# Patient Record
Sex: FEMALE | Race: WHITE | NOT HISPANIC OR LATINO | Employment: UNEMPLOYED | URBAN - METROPOLITAN AREA
[De-identification: names, ages, dates, MRNs, and addresses within clinical notes are randomized per-mention and may not be internally consistent; named-entity substitution may affect disease eponyms.]

---

## 2022-10-24 ENCOUNTER — OFFICE VISIT (OUTPATIENT)
Dept: MEDICAL GROUP | Facility: MEDICAL CENTER | Age: 57
End: 2022-10-24
Payer: COMMERCIAL

## 2022-10-24 ENCOUNTER — TELEPHONE (OUTPATIENT)
Dept: SCHEDULING | Facility: IMAGING CENTER | Age: 57
End: 2022-10-24

## 2022-10-24 VITALS
SYSTOLIC BLOOD PRESSURE: 128 MMHG | HEART RATE: 73 BPM | TEMPERATURE: 98.1 F | OXYGEN SATURATION: 99 % | WEIGHT: 169.75 LBS | HEIGHT: 64 IN | RESPIRATION RATE: 14 BRPM | BODY MASS INDEX: 28.98 KG/M2 | DIASTOLIC BLOOD PRESSURE: 84 MMHG

## 2022-10-24 DIAGNOSIS — M65.331 TRIGGER MIDDLE FINGER OF RIGHT HAND: ICD-10-CM

## 2022-10-24 DIAGNOSIS — Z00.00 ENCOUNTER FOR MEDICAL EXAMINATION TO ESTABLISH CARE: ICD-10-CM

## 2022-10-24 DIAGNOSIS — Z12.31 ENCOUNTER FOR SCREENING MAMMOGRAM FOR MALIGNANT NEOPLASM OF BREAST: ICD-10-CM

## 2022-10-24 DIAGNOSIS — Z82.49 FAMILY HISTORY OF EARLY CAD: ICD-10-CM

## 2022-10-24 PROCEDURE — 99204 OFFICE O/P NEW MOD 45 MIN: CPT | Performed by: STUDENT IN AN ORGANIZED HEALTH CARE EDUCATION/TRAINING PROGRAM

## 2022-10-24 ASSESSMENT — PATIENT HEALTH QUESTIONNAIRE - PHQ9: CLINICAL INTERPRETATION OF PHQ2 SCORE: 0

## 2022-10-24 NOTE — PROGRESS NOTES
"Subjective:     CC:  Diagnoses of Encounter for screening mammogram for malignant neoplasm of breast, BMI 29.0-29.9,adult, Family history of early CAD, and Trigger middle finger of right hand were pertinent to this visit.    HISTORY OF THE PRESENT ILLNESS: Patient is a 56 y.o. female. This pleasant patient is here today to establish care and discuss the following    Problem   Bmi 29.0-29.9,Adult   Family History of Early Cad    Both parents had heart attacks, 140 someone in their 50s.     Trigger Middle Finger of Right Hand    Previously saw musculoskeletal doctor and had cortisone injections.  She feels like her trigger finger is getting worse.  Sometimes she cannot hold a pen due to the stiffness.  This is on her right side so it does make everyday tasks difficult.         Health Maintenance: Completed    ROS:   ROS      Objective:     Exam: /84 (BP Location: Left arm, Patient Position: Sitting, BP Cuff Size: Adult)   Pulse 73   Temp 36.7 °C (98.1 °F) (Temporal)   Resp 14   Ht 1.615 m (5' 3.58\")   Wt 77 kg (169 lb 12.1 oz)   SpO2 99%  Body mass index is 29.52 kg/m².    Physical Exam  Vitals reviewed.   Constitutional:       General: She is not in acute distress.     Appearance: She is not toxic-appearing.   HENT:      Head: Normocephalic and atraumatic.      Right Ear: External ear normal.      Left Ear: External ear normal.      Nose: Nose normal. No congestion.      Mouth/Throat:      Mouth: Mucous membranes are moist.      Pharynx: Oropharynx is clear. No oropharyngeal exudate.   Eyes:      General:         Right eye: No discharge.         Left eye: No discharge.      Extraocular Movements: Extraocular movements intact.      Conjunctiva/sclera: Conjunctivae normal.   Cardiovascular:      Rate and Rhythm: Normal rate and regular rhythm.      Pulses: Normal pulses.      Heart sounds: Normal heart sounds. No murmur heard.  Pulmonary:      Effort: Pulmonary effort is normal. No respiratory distress.     "  Breath sounds: Normal breath sounds.   Abdominal:      General: Abdomen is flat. Bowel sounds are normal. There is no distension.      Palpations: Abdomen is soft.      Tenderness: There is no abdominal tenderness.   Musculoskeletal:         General: Normal range of motion.   Skin:     General: Skin is warm and dry.      Capillary Refill: Capillary refill takes less than 2 seconds.   Neurological:      Mental Status: She is alert.   Psychiatric:         Mood and Affect: Mood normal.         Behavior: Behavior normal.         Thought Content: Thought content normal.         Judgment: Judgment normal.         Assessment & Plan:   56 y.o. female with the following -    1. Encounter for medical examination to establish care  Medications, history, problem list and allergies reviewed.  Previous records requested from last doctor.    2. Encounter for screening mammogram for malignant neoplasm of breast  - MA-SCREENING MAMMO BILAT W/TOMOSYNTHESIS W/CAD; Future    3. BMI 29.0-29.9,adult  Screening as below  - HEMOGLOBIN A1C; Future  - Lipid Profile; Future    4. Family history of early CAD  Lipid screening due to premature cardiovascular disease in first-degree relatives  - Lipid Profile; Future    5. Trigger middle finger of right hand  Patient would like to proceed with possible joint injections, referral to orthopedics placed  - Referral to Orthopedics    Return in about 2 weeks (around 11/7/2022) for labs.    Please note that this dictation was created using voice recognition software. I have made every reasonable attempt to correct obvious errors, but I expect that there are errors of grammar and possibly content that I did not discover before finalizing the note.

## 2022-10-24 NOTE — LETTER
[facility]  Martine Campos M.D.  [address]  [place] [address]  Fax: [phone]   Authorization for Release/Disclosure of   Protected Health Information   Name: ANGELA WORTHINGTON : 1965 SSN: [id number]   Address: [address] [place] [id number] Phone:    [phone] (home)    I authorize the entity listed below to release/disclose the PHI below to:   [facility]/Martine Campos M.D. and Martine Campos M.D.   Provider or Entity Name:  Dr. Hamman [facility], [facility] Phone:      Fax:     Reason for request: continuity of care   Information to be released:    [  ] LAST COLONOSCOPY,  including any PATH REPORT and follow-up  [  ] LAST FIT/COLOGUARD RESULT [  ] LAST DEXA  [  ] LAST MAMMOGRAM  [  ] LAST PAP  [  ] LAST LABS [  ] RETINA EXAM REPORT  [  ] IMMUNIZATION RECORDS  [ XX ] Release all info      [  ] Check here and initial the line next to each item to release ALL health information INCLUDING  _____ Care and treatment for drug and / or alcohol abuse  _____ HIV testing, infection status, or AIDS  _____ Genetic Testing    DATES OF SERVICE OR TIME PERIOD TO BE DISCLOSED: _____________  I understand and acknowledge that:  * This Authorization may be revoked at any time by you in writing, except if your health information has already been used or disclosed.  * Your health information that will be used or disclosed as a result of you signing this authorization could be re-disclosed by the recipient. If this occurs, your re-disclosed health information may no longer be protected by State or Federal laws.  * You may refuse to sign this Authorization. Your refusal will not affect your ability to obtain treatment.  * This Authorization becomes effective upon signing and will  on (date) __________.      If no date is indicated, this Authorization will  one (1) year from the signature date.    Name: Angela  Darrell    Signature:   Date:     10/24/2022       PLEASE FAX REQUESTED RECORDS BACK TO: [phone]

## 2022-10-25 ENCOUNTER — HOSPITAL ENCOUNTER (OUTPATIENT)
Dept: LAB | Facility: MEDICAL CENTER | Age: 57
End: 2022-10-25
Attending: STUDENT IN AN ORGANIZED HEALTH CARE EDUCATION/TRAINING PROGRAM
Payer: COMMERCIAL

## 2022-10-25 DIAGNOSIS — Z82.49 FAMILY HISTORY OF EARLY CAD: ICD-10-CM

## 2022-10-25 LAB
CHOLEST SERPL-MCNC: 212 MG/DL (ref 100–199)
EST. AVERAGE GLUCOSE BLD GHB EST-MCNC: 114 MG/DL
FASTING STATUS PATIENT QL REPORTED: NORMAL
HBA1C MFR BLD: 5.6 % (ref 4–5.6)
HDLC SERPL-MCNC: 74 MG/DL
LDLC SERPL CALC-MCNC: 115 MG/DL
TRIGL SERPL-MCNC: 113 MG/DL (ref 0–149)

## 2022-10-25 PROCEDURE — 80061 LIPID PANEL: CPT

## 2022-10-25 PROCEDURE — 83036 HEMOGLOBIN GLYCOSYLATED A1C: CPT

## 2022-10-25 PROCEDURE — 36415 COLL VENOUS BLD VENIPUNCTURE: CPT

## 2022-10-27 ENCOUNTER — TELEPHONE (OUTPATIENT)
Dept: MEDICAL GROUP | Facility: MEDICAL CENTER | Age: 57
End: 2022-10-27
Payer: COMMERCIAL

## 2022-10-27 ENCOUNTER — HOSPITAL ENCOUNTER (OUTPATIENT)
Dept: RADIOLOGY | Facility: MEDICAL CENTER | Age: 57
End: 2022-10-27
Attending: STUDENT IN AN ORGANIZED HEALTH CARE EDUCATION/TRAINING PROGRAM
Payer: COMMERCIAL

## 2022-10-27 DIAGNOSIS — Z12.31 ENCOUNTER FOR SCREENING MAMMOGRAM FOR MALIGNANT NEOPLASM OF BREAST: ICD-10-CM

## 2022-10-27 PROCEDURE — 77063 BREAST TOMOSYNTHESIS BI: CPT

## 2022-10-27 NOTE — TELEPHONE ENCOUNTER
Phone Number Called: [phone]    Call outcome: Spoke to patient regarding message below.    Message: Pt was contacted in regards of lab results. Pt will follow up with her pcp.

## 2022-10-27 NOTE — TELEPHONE ENCOUNTER
----- Message from Martine Campos M.D. sent at 10/25/2022 11:39 AM PDT -----  Please let patient know that blood sugars are normal.  Cholesterol is slightly elevated.  We will talk about this more at her next visit.    Thank You,  Dr. Campos

## 2022-11-10 ENCOUNTER — APPOINTMENT (OUTPATIENT)
Dept: MEDICAL GROUP | Facility: MEDICAL CENTER | Age: 57
End: 2022-11-10
Payer: COMMERCIAL

## 2022-11-11 ENCOUNTER — OFFICE VISIT (OUTPATIENT)
Dept: MEDICAL GROUP | Facility: MEDICAL CENTER | Age: 57
End: 2022-11-11
Payer: COMMERCIAL

## 2022-11-11 VITALS
WEIGHT: 169 LBS | TEMPERATURE: 97.5 F | HEIGHT: 63 IN | SYSTOLIC BLOOD PRESSURE: 110 MMHG | DIASTOLIC BLOOD PRESSURE: 78 MMHG | RESPIRATION RATE: 14 BRPM | OXYGEN SATURATION: 99 % | BODY MASS INDEX: 29.95 KG/M2 | HEART RATE: 84 BPM

## 2022-11-11 DIAGNOSIS — R13.19 ESOPHAGEAL DYSPHAGIA: ICD-10-CM

## 2022-11-11 DIAGNOSIS — E78.49 OTHER HYPERLIPIDEMIA: ICD-10-CM

## 2022-11-11 PROCEDURE — 99214 OFFICE O/P EST MOD 30 MIN: CPT | Performed by: STUDENT IN AN ORGANIZED HEALTH CARE EDUCATION/TRAINING PROGRAM

## 2022-11-11 NOTE — PROGRESS NOTES
"Subjective:     CC: Swallowing issue, labs    HPI:   Jaymie presents today with    Problem   Other Hyperlipidemia    The 10-year ASCVD risk score (Dakotah ZABALA, et al., 2019) is: 1.5%     Lab Results   Component Value Date/Time    CHOLSTRLTOT 212 (H) 10/25/2022 09:30 AM     (H) 10/25/2022 09:30 AM    HDL 74 10/25/2022 09:30 AM    TRIGLYCERIDE 113 10/25/2022 09:30 AM              Esophageal Dysphagia    Sensation of food getting stuck in her chest when she swallows sometimes.  Notices it only with solid foods and not with liquids.           Health Maintenance: Completed    ROS:  ROS    Objective:     Exam:  /78 (BP Location: Left arm, Patient Position: Sitting, BP Cuff Size: Adult)   Pulse 84   Temp 36.4 °C (97.5 °F) (Temporal)   Resp 14   Ht 1.6 m (5' 3\")   Wt 76.7 kg (169 lb)   SpO2 99%   BMI 29.94 kg/m²  Body mass index is 29.94 kg/m².    Physical Exam  Vitals reviewed.   Constitutional:       General: She is not in acute distress.     Appearance: She is not toxic-appearing.   HENT:      Head: Normocephalic and atraumatic.      Right Ear: External ear normal.      Left Ear: External ear normal.   Eyes:      General:         Right eye: No discharge.         Left eye: No discharge.      Extraocular Movements: Extraocular movements intact.      Conjunctiva/sclera: Conjunctivae normal.   Pulmonary:      Effort: Pulmonary effort is normal. No respiratory distress.   Skin:     General: Skin is warm and dry.   Neurological:      Mental Status: She is alert.   Psychiatric:         Mood and Affect: Mood normal.         Behavior: Behavior normal.         Thought Content: Thought content normal.         Judgment: Judgment normal.       Assessment & Plan:     57 y.o. female with the following -     1. Other hyperlipidemia  This is a new problem, ASCVD risk very low.  Discussed her slight increase in risk due to her family history of early MI.  Follow-up in 1 year.    2. Esophageal dysphagia  Concern for " esophageal dysmotility.  Barium swallow ordered.  - DX-ESOPHAGUS BARIUM SWALLOW SINGLE CONTRAST; Future    Return in about 1 year (around 11/11/2023).    Please note that this dictation was created using voice recognition software. I have made every reasonable attempt to correct obvious errors, but I expect that there are errors of grammar and possibly content that I did not discover before finalizing the note.

## 2022-12-01 ENCOUNTER — TELEPHONE (OUTPATIENT)
Dept: HEALTH INFORMATION MANAGEMENT | Facility: OTHER | Age: 57
End: 2022-12-01

## 2022-12-01 ENCOUNTER — HOSPITAL ENCOUNTER (OUTPATIENT)
Dept: RADIOLOGY | Facility: MEDICAL CENTER | Age: 57
End: 2022-12-01
Attending: STUDENT IN AN ORGANIZED HEALTH CARE EDUCATION/TRAINING PROGRAM
Payer: COMMERCIAL

## 2022-12-01 DIAGNOSIS — R13.19 ESOPHAGEAL DYSPHAGIA: ICD-10-CM

## 2022-12-01 PROCEDURE — 74220 X-RAY XM ESOPHAGUS 1CNTRST: CPT

## 2022-12-01 NOTE — TELEPHONE ENCOUNTER
I called and spoke to the patient and delivered Martine's message:  that her barium swallow showed severe acid reflux and a moderate hiatal hernia which means a portion of the stomach is entering into the space where the esophagus is.  If it is bothering her or she is having reflux symptoms we can start a medication for heartburn.  There are also surgical options if she would like to fix the hiatal hernia, I would like her to schedule an appointment if she wants to talk about this further.      The patient denied having reflux.

## 2022-12-12 ENCOUNTER — TELEPHONE (OUTPATIENT)
Dept: HEALTH INFORMATION MANAGEMENT | Facility: OTHER | Age: 57
End: 2022-12-12
Payer: COMMERCIAL

## 2022-12-12 DIAGNOSIS — R13.19 ESOPHAGEAL DYSPHAGIA: ICD-10-CM

## 2022-12-13 NOTE — TELEPHONE ENCOUNTER
I spoke to the patient regarding the results of the barium swallow.  She reported having been told  about the aid reflux, however she had not been informed of the hiatal hernia.    The patient continues with significant swallowing problems and asks what is the next thing to figure out the swallowing issue.                                                                                                                                                                                               with significant swallowing probllems and asks

## 2022-12-13 NOTE — TELEPHONE ENCOUNTER
Please let patient know that I would suggest that the next step would be a referral to GI for a possible endoscopy. If she would like me to put in this referral I am happy to.    Thank You,  Dr. Campos

## 2023-06-13 ENCOUNTER — OFFICE VISIT (OUTPATIENT)
Dept: MEDICAL GROUP | Facility: MEDICAL CENTER | Age: 58
End: 2023-06-13
Payer: COMMERCIAL

## 2023-06-13 VITALS
OXYGEN SATURATION: 99 % | DIASTOLIC BLOOD PRESSURE: 72 MMHG | HEIGHT: 63 IN | HEART RATE: 77 BPM | BODY MASS INDEX: 30.47 KG/M2 | TEMPERATURE: 97.3 F | SYSTOLIC BLOOD PRESSURE: 114 MMHG | WEIGHT: 171.96 LBS | RESPIRATION RATE: 16 BRPM

## 2023-06-13 DIAGNOSIS — Z12.11 COLON CANCER SCREENING: ICD-10-CM

## 2023-06-13 DIAGNOSIS — L98.9 SKIN LESION OF BACK: ICD-10-CM

## 2023-06-13 DIAGNOSIS — Z23 IMMUNIZATION DUE: ICD-10-CM

## 2023-06-13 DIAGNOSIS — K40.90 NON-RECURRENT UNILATERAL INGUINAL HERNIA WITHOUT OBSTRUCTION OR GANGRENE: ICD-10-CM

## 2023-06-13 DIAGNOSIS — L98.9 SKIN LESION OF FOOT: ICD-10-CM

## 2023-06-13 PROCEDURE — 3074F SYST BP LT 130 MM HG: CPT | Performed by: STUDENT IN AN ORGANIZED HEALTH CARE EDUCATION/TRAINING PROGRAM

## 2023-06-13 PROCEDURE — 90715 TDAP VACCINE 7 YRS/> IM: CPT | Performed by: STUDENT IN AN ORGANIZED HEALTH CARE EDUCATION/TRAINING PROGRAM

## 2023-06-13 PROCEDURE — 99214 OFFICE O/P EST MOD 30 MIN: CPT | Mod: 25 | Performed by: STUDENT IN AN ORGANIZED HEALTH CARE EDUCATION/TRAINING PROGRAM

## 2023-06-13 PROCEDURE — 3078F DIAST BP <80 MM HG: CPT | Performed by: STUDENT IN AN ORGANIZED HEALTH CARE EDUCATION/TRAINING PROGRAM

## 2023-06-13 PROCEDURE — 90471 IMMUNIZATION ADMIN: CPT | Performed by: STUDENT IN AN ORGANIZED HEALTH CARE EDUCATION/TRAINING PROGRAM

## 2023-06-13 ASSESSMENT — PATIENT HEALTH QUESTIONNAIRE - PHQ9: CLINICAL INTERPRETATION OF PHQ2 SCORE: 0

## 2023-06-13 NOTE — PROGRESS NOTES
"Subjective:     CC: Skin lesions, inguinal hernia    HPI:   Jaymie presents today with    Problem   Skin Lesion of Back    This is a chronic condition.  She has 2 moles that have appeared under the waistband on her lower back on the left side.  These are not in sun exposed areas.     Skin Lesion of Foot    This is a chronic condition.  She has a dark brown lesion on the bottom of her left foot.  She states it has been there for couple years but is growing.  This is not the sun exposed area.     Non-Recurrent Unilateral Inguinal Hernia Without Obstruction Or Gangrene    This is a new condition.  She has a new bulge in the left groin.  She states that sometimes it causes discomfort.  There is definitely pain when she is exerting herself.  At first she thought it was related to her hip pain, but it appears softer and in a different location.       ROS:  ROS    Objective:     Exam:  /72   Pulse 77   Temp 36.3 °C (97.3 °F) (Temporal)   Resp 16   Ht 1.6 m (5' 3\")   Wt 78 kg (171 lb 15.3 oz)   SpO2 99%   BMI 30.46 kg/m²  Body mass index is 30.46 kg/m².    Physical Exam  Vitals reviewed.   Constitutional:       General: She is not in acute distress.     Appearance: She is not toxic-appearing.   HENT:      Head: Normocephalic and atraumatic.      Right Ear: External ear normal.      Left Ear: External ear normal.   Eyes:      General:         Right eye: No discharge.         Left eye: No discharge.      Extraocular Movements: Extraocular movements intact.      Conjunctiva/sclera: Conjunctivae normal.   Pulmonary:      Effort: Pulmonary effort is normal. No respiratory distress.   Abdominal:      General: Abdomen is flat.      Palpations: Abdomen is soft.      Hernia: A hernia (3-4 cm bulge in the right groin, reducible, soft) is present.   Skin:     General: Skin is warm and dry.      Comments: Dark brown lesion on the bottom of the left foot.  Smudged borders, not raised.  Two light brown moles on the left " buttock/lower back.   Neurological:      Mental Status: She is alert.   Psychiatric:         Mood and Affect: Mood normal.         Behavior: Behavior normal.         Thought Content: Thought content normal.         Judgment: Judgment normal.           Assessment & Plan:     57 y.o. female with the following -     1. Non-recurrent unilateral inguinal hernia without obstruction or gangrene  Exam is very concerning for hernia.  Ultrasound ordered.  Based on results I will send her to general surgery  - US-INGUINAL HERNIA; Future    2. Skin lesion of foot  This is a chronic condition, given the location and onset exposed area and the growth of the lesion I am concerned for possible melanoma, referral to dermatology sent as urgent  - Referral to Dermatology    3. Skin lesion of back  This is a chronic condition, 2 moles on the buttocks, these are not in sun exposed areas, the appearance is not concerning, referral to dermatology sent  - Referral to Dermatology    4. Immunization due  - Tdap =>8yo IM    5. Colon cancer screening  - COLOGUARD (FIT DNA)        No follow-ups on file.    Please note that this dictation was created using voice recognition software. I have made every reasonable attempt to correct obvious errors, but I expect that there are errors of grammar and possibly content that I did not discover before finalizing the note.

## 2023-07-03 ENCOUNTER — TELEPHONE (OUTPATIENT)
Dept: MEDICAL GROUP | Facility: MEDICAL CENTER | Age: 58
End: 2023-07-03
Payer: COMMERCIAL

## 2023-07-03 NOTE — TELEPHONE ENCOUNTER
Phone Number Called: [phone]    Call outcome: Spoke to patient regarding message below.    Message: Spoke to patient regarding message below. No additional questions.

## 2023-07-03 NOTE — TELEPHONE ENCOUNTER
----- Message from Martine Campos M.D. sent at 7/3/2023  3:04 PM PDT -----  Please let patient know that her Cologuard testing was normal    Thank You,  Dr. Campos

## 2023-07-06 ENCOUNTER — APPOINTMENT (RX ONLY)
Dept: URBAN - METROPOLITAN AREA CLINIC 4 | Facility: CLINIC | Age: 58
Setting detail: DERMATOLOGY
End: 2023-07-06

## 2023-07-06 DIAGNOSIS — Z71.89 OTHER SPECIFIED COUNSELING: ICD-10-CM

## 2023-07-06 DIAGNOSIS — D22 MELANOCYTIC NEVI: ICD-10-CM

## 2023-07-06 DIAGNOSIS — L81.4 OTHER MELANIN HYPERPIGMENTATION: ICD-10-CM

## 2023-07-06 DIAGNOSIS — L82.1 OTHER SEBORRHEIC KERATOSIS: ICD-10-CM

## 2023-07-06 PROBLEM — D22.5 MELANOCYTIC NEVI OF TRUNK: Status: ACTIVE | Noted: 2023-07-06

## 2023-07-06 PROBLEM — D22.9 MELANOCYTIC NEVI, UNSPECIFIED: Status: ACTIVE | Noted: 2023-07-06

## 2023-07-06 PROBLEM — D22.62 MELANOCYTIC NEVI OF LEFT UPPER LIMB, INCLUDING SHOULDER: Status: ACTIVE | Noted: 2023-07-06

## 2023-07-06 PROBLEM — D22.71 MELANOCYTIC NEVI OF RIGHT LOWER LIMB, INCLUDING HIP: Status: ACTIVE | Noted: 2023-07-06

## 2023-07-06 PROCEDURE — ? COUNSELING

## 2023-07-06 PROCEDURE — 99203 OFFICE O/P NEW LOW 30 MIN: CPT

## 2023-07-06 PROCEDURE — ? ADDITIONAL NOTES

## 2023-07-06 ASSESSMENT — LOCATION ZONE DERM
LOCATION ZONE: FEET
LOCATION ZONE: TRUNK
LOCATION ZONE: HAND

## 2023-07-06 ASSESSMENT — LOCATION SIMPLE DESCRIPTION DERM
LOCATION SIMPLE: LEFT HAND
LOCATION SIMPLE: RIGHT UPPER BACK
LOCATION SIMPLE: RIGHT PLANTAR SURFACE
LOCATION SIMPLE: ABDOMEN
LOCATION SIMPLE: LEFT UPPER BACK
LOCATION SIMPLE: CHEST

## 2023-07-06 ASSESSMENT — LOCATION DETAILED DESCRIPTION DERM
LOCATION DETAILED: RIGHT PLANTAR FOREFOOT OVERLYING 1ST METATARSAL
LOCATION DETAILED: PERIUMBILICAL SKIN
LOCATION DETAILED: MIDDLE STERNUM
LOCATION DETAILED: RIGHT MEDIAL UPPER BACK
LOCATION DETAILED: LEFT THENAR EMINENCE
LOCATION DETAILED: LEFT SUPERIOR UPPER BACK
LOCATION DETAILED: RIGHT SUPERIOR UPPER BACK

## 2023-07-06 NOTE — PROCEDURE: ADDITIONAL NOTES
Additional Notes: Will re-check the nevus of the back at f/u. Self skin monitoring with reporting. Photos taken.
Detail Level: Simple
Render Risk Assessment In Note?: no

## 2023-07-06 NOTE — PROCEDURE: MIPS QUALITY
Patent
Detail Level: Detailed
Quality 226: Preventive Care And Screening: Tobacco Use: Screening And Cessation Intervention: Patient screened for tobacco use and is an ex/non-smoker
Quality 130: Documentation Of Current Medications In The Medical Record: Current Medications Documented

## 2023-07-19 ENCOUNTER — HOSPITAL ENCOUNTER (OUTPATIENT)
Dept: RADIOLOGY | Facility: MEDICAL CENTER | Age: 58
End: 2023-07-19
Attending: STUDENT IN AN ORGANIZED HEALTH CARE EDUCATION/TRAINING PROGRAM
Payer: COMMERCIAL

## 2023-07-19 DIAGNOSIS — K40.90 NON-RECURRENT UNILATERAL INGUINAL HERNIA WITHOUT OBSTRUCTION OR GANGRENE: ICD-10-CM

## 2023-07-19 PROCEDURE — 76857 US EXAM PELVIC LIMITED: CPT

## 2023-07-20 NOTE — RESULT ENCOUNTER NOTE
Please let patient know that she does have a hernia.  The treatment for this would be to see a general surgeon and get a hernia repair.  Please let me know if she is interested and I will put in the referral.    Thank You,  Dr. Campos

## 2023-07-21 ENCOUNTER — TELEPHONE (OUTPATIENT)
Dept: MEDICAL GROUP | Facility: MEDICAL CENTER | Age: 58
End: 2023-07-21
Payer: COMMERCIAL

## 2023-07-21 DIAGNOSIS — K40.90 NON-RECURRENT UNILATERAL INGUINAL HERNIA WITHOUT OBSTRUCTION OR GANGRENE: ICD-10-CM

## 2023-07-21 NOTE — TELEPHONE ENCOUNTER
----- Message from Karen Macdonald R.N. sent at 7/21/2023 12:42 PM PDT -----  Discussed results with patient she would like a referral to General surgery

## 2023-07-21 NOTE — TELEPHONE ENCOUNTER
Please let patient know that I have put in that referral.  It can take about a week for it to be completed    Thank You,  Dr. Campos

## 2023-08-30 ENCOUNTER — APPOINTMENT (OUTPATIENT)
Dept: ADMISSIONS | Facility: MEDICAL CENTER | Age: 58
End: 2023-08-30
Attending: SURGERY
Payer: COMMERCIAL

## 2023-09-18 ENCOUNTER — PRE-ADMISSION TESTING (OUTPATIENT)
Dept: ADMISSIONS | Facility: MEDICAL CENTER | Age: 58
End: 2023-09-18
Attending: SURGERY
Payer: COMMERCIAL

## 2023-09-18 VITALS — HEIGHT: 64 IN | BODY MASS INDEX: 29.52 KG/M2

## 2023-09-18 NOTE — PREPROCEDURE INSTRUCTIONS
"Pre-admit appointment completed.  \"Preparing for your procedure\" reviewed with pt along with verbal and written instructions.  Patient instructed per pharmacy guidelines regarding taking or holding regularly prescribed medications before surgery.  Instructed to take the following medications the day of surgery with a sip of water per pharmacy medication guidelines:   none  "

## 2023-10-11 ENCOUNTER — ANESTHESIA (OUTPATIENT)
Dept: SURGERY | Facility: MEDICAL CENTER | Age: 58
End: 2023-10-11
Payer: COMMERCIAL

## 2023-10-11 ENCOUNTER — HOSPITAL ENCOUNTER (OUTPATIENT)
Facility: MEDICAL CENTER | Age: 58
End: 2023-10-11
Attending: SURGERY | Admitting: SURGERY
Payer: COMMERCIAL

## 2023-10-11 ENCOUNTER — ANESTHESIA EVENT (OUTPATIENT)
Dept: SURGERY | Facility: MEDICAL CENTER | Age: 58
End: 2023-10-11
Payer: COMMERCIAL

## 2023-10-11 VITALS
DIASTOLIC BLOOD PRESSURE: 74 MMHG | TEMPERATURE: 97.3 F | SYSTOLIC BLOOD PRESSURE: 132 MMHG | OXYGEN SATURATION: 96 % | BODY MASS INDEX: 30.47 KG/M2 | HEIGHT: 63 IN | WEIGHT: 171.96 LBS | RESPIRATION RATE: 18 BRPM | HEART RATE: 89 BPM

## 2023-10-11 DIAGNOSIS — K40.90 NON-RECURRENT UNILATERAL INGUINAL HERNIA WITHOUT OBSTRUCTION OR GANGRENE: ICD-10-CM

## 2023-10-11 LAB — PATHOLOGY CONSULT NOTE: NORMAL

## 2023-10-11 PROCEDURE — 160048 HCHG OR STATISTICAL LEVEL 1-5: Performed by: SURGERY

## 2023-10-11 PROCEDURE — 160002 HCHG RECOVERY MINUTES (STAT): Performed by: SURGERY

## 2023-10-11 PROCEDURE — 160046 HCHG PACU - 1ST 60 MINS PHASE II: Performed by: SURGERY

## 2023-10-11 PROCEDURE — 502000 HCHG MISC OR IMPLANTS RC 0278: Performed by: SURGERY

## 2023-10-11 PROCEDURE — 160009 HCHG ANES TIME/MIN: Performed by: SURGERY

## 2023-10-11 PROCEDURE — 700105 HCHG RX REV CODE 258: Performed by: SURGERY

## 2023-10-11 PROCEDURE — 700101 HCHG RX REV CODE 250: Performed by: ANESTHESIOLOGY

## 2023-10-11 PROCEDURE — 160029 HCHG SURGERY MINUTES - 1ST 30 MINS LEVEL 4: Performed by: SURGERY

## 2023-10-11 PROCEDURE — 88305 TISSUE EXAM BY PATHOLOGIST: CPT

## 2023-10-11 PROCEDURE — A9270 NON-COVERED ITEM OR SERVICE: HCPCS | Performed by: ANESTHESIOLOGY

## 2023-10-11 PROCEDURE — 700111 HCHG RX REV CODE 636 W/ 250 OVERRIDE (IP): Mod: JZ | Performed by: ANESTHESIOLOGY

## 2023-10-11 PROCEDURE — 502714 HCHG ROBOTIC SURGERY SERVICES: Performed by: SURGERY

## 2023-10-11 PROCEDURE — C1781 MESH (IMPLANTABLE): HCPCS | Performed by: SURGERY

## 2023-10-11 PROCEDURE — 160041 HCHG SURGERY MINUTES - EA ADDL 1 MIN LEVEL 4: Performed by: SURGERY

## 2023-10-11 PROCEDURE — 160025 RECOVERY II MINUTES (STATS): Performed by: SURGERY

## 2023-10-11 PROCEDURE — 700101 HCHG RX REV CODE 250: Performed by: SURGERY

## 2023-10-11 PROCEDURE — 160035 HCHG PACU - 1ST 60 MINS PHASE I: Performed by: SURGERY

## 2023-10-11 PROCEDURE — 700102 HCHG RX REV CODE 250 W/ 637 OVERRIDE(OP): Performed by: ANESTHESIOLOGY

## 2023-10-11 DEVICE — IMPLANTABLE DEVICE: Type: IMPLANTABLE DEVICE | Site: GROIN | Status: FUNCTIONAL

## 2023-10-11 RX ORDER — OXYCODONE HCL 5 MG/5 ML
5 SOLUTION, ORAL ORAL
Status: COMPLETED | OUTPATIENT
Start: 2023-10-11 | End: 2023-10-11

## 2023-10-11 RX ORDER — CEFAZOLIN SODIUM 1 G/3ML
INJECTION, POWDER, FOR SOLUTION INTRAMUSCULAR; INTRAVENOUS PRN
Status: DISCONTINUED | OUTPATIENT
Start: 2023-10-11 | End: 2023-10-11 | Stop reason: SURG

## 2023-10-11 RX ORDER — MIDAZOLAM HYDROCHLORIDE 1 MG/ML
INJECTION INTRAMUSCULAR; INTRAVENOUS PRN
Status: DISCONTINUED | OUTPATIENT
Start: 2023-10-11 | End: 2023-10-11 | Stop reason: SURG

## 2023-10-11 RX ORDER — DEXAMETHASONE SODIUM PHOSPHATE 4 MG/ML
INJECTION, SOLUTION INTRA-ARTICULAR; INTRALESIONAL; INTRAMUSCULAR; INTRAVENOUS; SOFT TISSUE PRN
Status: DISCONTINUED | OUTPATIENT
Start: 2023-10-11 | End: 2023-10-11 | Stop reason: SURG

## 2023-10-11 RX ORDER — LABETALOL HYDROCHLORIDE 5 MG/ML
5 INJECTION, SOLUTION INTRAVENOUS
Status: DISCONTINUED | OUTPATIENT
Start: 2023-10-11 | End: 2023-10-11 | Stop reason: HOSPADM

## 2023-10-11 RX ORDER — MEPERIDINE HYDROCHLORIDE 25 MG/ML
12.5 INJECTION INTRAMUSCULAR; INTRAVENOUS; SUBCUTANEOUS
Status: DISCONTINUED | OUTPATIENT
Start: 2023-10-11 | End: 2023-10-11 | Stop reason: HOSPADM

## 2023-10-11 RX ORDER — GLYCOPYRROLATE 0.2 MG/ML
INJECTION INTRAMUSCULAR; INTRAVENOUS PRN
Status: DISCONTINUED | OUTPATIENT
Start: 2023-10-11 | End: 2023-10-11 | Stop reason: SURG

## 2023-10-11 RX ORDER — HYDROMORPHONE HYDROCHLORIDE 1 MG/ML
0.2 INJECTION, SOLUTION INTRAMUSCULAR; INTRAVENOUS; SUBCUTANEOUS
Status: DISCONTINUED | OUTPATIENT
Start: 2023-10-11 | End: 2023-10-11 | Stop reason: HOSPADM

## 2023-10-11 RX ORDER — SODIUM CHLORIDE, SODIUM LACTATE, POTASSIUM CHLORIDE, CALCIUM CHLORIDE 600; 310; 30; 20 MG/100ML; MG/100ML; MG/100ML; MG/100ML
INJECTION, SOLUTION INTRAVENOUS CONTINUOUS
Status: DISCONTINUED | OUTPATIENT
Start: 2023-10-11 | End: 2023-10-11 | Stop reason: HOSPADM

## 2023-10-11 RX ORDER — HYDROMORPHONE HYDROCHLORIDE 1 MG/ML
0.1 INJECTION, SOLUTION INTRAMUSCULAR; INTRAVENOUS; SUBCUTANEOUS
Status: DISCONTINUED | OUTPATIENT
Start: 2023-10-11 | End: 2023-10-11 | Stop reason: HOSPADM

## 2023-10-11 RX ORDER — OXYCODONE HCL 5 MG/5 ML
10 SOLUTION, ORAL ORAL
Status: COMPLETED | OUTPATIENT
Start: 2023-10-11 | End: 2023-10-11

## 2023-10-11 RX ORDER — DIPHENHYDRAMINE HYDROCHLORIDE 50 MG/ML
12.5 INJECTION INTRAMUSCULAR; INTRAVENOUS
Status: DISCONTINUED | OUTPATIENT
Start: 2023-10-11 | End: 2023-10-11 | Stop reason: HOSPADM

## 2023-10-11 RX ORDER — IPRATROPIUM BROMIDE AND ALBUTEROL SULFATE 2.5; .5 MG/3ML; MG/3ML
3 SOLUTION RESPIRATORY (INHALATION)
Status: DISCONTINUED | OUTPATIENT
Start: 2023-10-11 | End: 2023-10-11 | Stop reason: HOSPADM

## 2023-10-11 RX ORDER — HALOPERIDOL 5 MG/ML
1 INJECTION INTRAMUSCULAR
Status: DISCONTINUED | OUTPATIENT
Start: 2023-10-11 | End: 2023-10-11 | Stop reason: HOSPADM

## 2023-10-11 RX ORDER — KETOROLAC TROMETHAMINE 30 MG/ML
INJECTION, SOLUTION INTRAMUSCULAR; INTRAVENOUS PRN
Status: DISCONTINUED | OUTPATIENT
Start: 2023-10-11 | End: 2023-10-11 | Stop reason: SURG

## 2023-10-11 RX ORDER — ROCURONIUM BROMIDE 10 MG/ML
INJECTION, SOLUTION INTRAVENOUS PRN
Status: DISCONTINUED | OUTPATIENT
Start: 2023-10-11 | End: 2023-10-11 | Stop reason: SURG

## 2023-10-11 RX ORDER — OXYCODONE HYDROCHLORIDE 5 MG/1
5 TABLET ORAL EVERY 6 HOURS PRN
Qty: 20 TABLET | Refills: 0 | Status: SHIPPED | OUTPATIENT
Start: 2023-10-11 | End: 2023-10-16

## 2023-10-11 RX ORDER — ONDANSETRON 2 MG/ML
INJECTION INTRAMUSCULAR; INTRAVENOUS PRN
Status: DISCONTINUED | OUTPATIENT
Start: 2023-10-11 | End: 2023-10-11 | Stop reason: SURG

## 2023-10-11 RX ORDER — LIDOCAINE HYDROCHLORIDE 20 MG/ML
INJECTION, SOLUTION EPIDURAL; INFILTRATION; INTRACAUDAL; PERINEURAL PRN
Status: DISCONTINUED | OUTPATIENT
Start: 2023-10-11 | End: 2023-10-11 | Stop reason: SURG

## 2023-10-11 RX ORDER — BUPIVACAINE HYDROCHLORIDE AND EPINEPHRINE 5; 5 MG/ML; UG/ML
INJECTION, SOLUTION EPIDURAL; INTRACAUDAL; PERINEURAL
Status: DISCONTINUED | OUTPATIENT
Start: 2023-10-11 | End: 2023-10-11 | Stop reason: HOSPADM

## 2023-10-11 RX ORDER — EPHEDRINE SULFATE 50 MG/ML
5 INJECTION, SOLUTION INTRAVENOUS
Status: DISCONTINUED | OUTPATIENT
Start: 2023-10-11 | End: 2023-10-11 | Stop reason: HOSPADM

## 2023-10-11 RX ORDER — HYDROMORPHONE HYDROCHLORIDE 1 MG/ML
0.4 INJECTION, SOLUTION INTRAMUSCULAR; INTRAVENOUS; SUBCUTANEOUS
Status: DISCONTINUED | OUTPATIENT
Start: 2023-10-11 | End: 2023-10-11 | Stop reason: HOSPADM

## 2023-10-11 RX ORDER — METOPROLOL TARTRATE 1 MG/ML
1 INJECTION, SOLUTION INTRAVENOUS
Status: DISCONTINUED | OUTPATIENT
Start: 2023-10-11 | End: 2023-10-11 | Stop reason: HOSPADM

## 2023-10-11 RX ORDER — ONDANSETRON 2 MG/ML
4 INJECTION INTRAMUSCULAR; INTRAVENOUS
Status: DISCONTINUED | OUTPATIENT
Start: 2023-10-11 | End: 2023-10-11 | Stop reason: HOSPADM

## 2023-10-11 RX ORDER — HYDRALAZINE HYDROCHLORIDE 20 MG/ML
5 INJECTION INTRAMUSCULAR; INTRAVENOUS
Status: DISCONTINUED | OUTPATIENT
Start: 2023-10-11 | End: 2023-10-11 | Stop reason: HOSPADM

## 2023-10-11 RX ADMIN — OXYCODONE HYDROCHLORIDE 5 MG: 5 SOLUTION ORAL at 10:41

## 2023-10-11 RX ADMIN — FENTANYL CITRATE 50 MCG: 50 INJECTION, SOLUTION INTRAMUSCULAR; INTRAVENOUS at 10:29

## 2023-10-11 RX ADMIN — SODIUM CHLORIDE, POTASSIUM CHLORIDE, SODIUM LACTATE AND CALCIUM CHLORIDE: 600; 310; 30; 20 INJECTION, SOLUTION INTRAVENOUS at 08:21

## 2023-10-11 RX ADMIN — DEXAMETHASONE SODIUM PHOSPHATE 8 MG: 4 INJECTION INTRA-ARTICULAR; INTRALESIONAL; INTRAMUSCULAR; INTRAVENOUS; SOFT TISSUE at 09:42

## 2023-10-11 RX ADMIN — SUGAMMADEX 200 MG: 100 INJECTION, SOLUTION INTRAVENOUS at 10:30

## 2023-10-11 RX ADMIN — FENTANYL CITRATE 100 MCG: 50 INJECTION, SOLUTION INTRAMUSCULAR; INTRAVENOUS at 09:31

## 2023-10-11 RX ADMIN — GLYCOPYRROLATE 0.2 MG: 0.2 INJECTION INTRAMUSCULAR; INTRAVENOUS at 10:17

## 2023-10-11 RX ADMIN — ONDANSETRON 4 MG: 2 INJECTION INTRAMUSCULAR; INTRAVENOUS at 10:26

## 2023-10-11 RX ADMIN — PROPOFOL 200 MG: 10 INJECTION, EMULSION INTRAVENOUS at 09:33

## 2023-10-11 RX ADMIN — LIDOCAINE HYDROCHLORIDE 0.5 ML: 10 INJECTION, SOLUTION EPIDURAL; INFILTRATION; INTRACAUDAL; PERINEURAL at 08:21

## 2023-10-11 RX ADMIN — LIDOCAINE HYDROCHLORIDE 100 MG: 20 INJECTION, SOLUTION EPIDURAL; INFILTRATION; INTRACAUDAL at 09:33

## 2023-10-11 RX ADMIN — MIDAZOLAM 2 MG: 1 INJECTION, SOLUTION INTRAMUSCULAR; INTRAVENOUS at 09:28

## 2023-10-11 RX ADMIN — ROCURONIUM BROMIDE 50 MG: 50 INJECTION, SOLUTION INTRAVENOUS at 09:33

## 2023-10-11 RX ADMIN — KETOROLAC TROMETHAMINE 30 MG: 30 INJECTION, SOLUTION INTRAMUSCULAR; INTRAVENOUS at 10:26

## 2023-10-11 RX ADMIN — CEFAZOLIN 2 G: 1 INJECTION, POWDER, FOR SOLUTION INTRAMUSCULAR; INTRAVENOUS at 09:41

## 2023-10-11 ASSESSMENT — PAIN DESCRIPTION - PAIN TYPE: TYPE: ACUTE PAIN

## 2023-10-11 ASSESSMENT — PAIN SCALES - GENERAL: PAIN_LEVEL: 1

## 2023-10-11 NOTE — ANESTHESIA PROCEDURE NOTES
Airway    Date/Time: 10/11/2023 9:35 AM    Performed by: Ruperto Silverio M.D.  Authorized by: Ruperto Silverio M.D.    Location:  OR  Urgency:  Elective  Difficult Airway: No    Indications for Airway Management:  Anesthesia      Spontaneous Ventilation: absent    Sedation Level:  Deep  Preoxygenated: Yes    Patient Position:  Sniffing  Mask Difficulty Assessment:  1 - vent by mask  Final Airway Type:  Endotracheal airway  Final Endotracheal Airway:  ETT  Cuffed: Yes    Technique Used for Successful ETT Placement:  Direct laryngoscopy    Insertion Site:  Oral  Blade Type:  Denise  Laryngoscope Blade/Videolaryngoscope Blade Size:  2  ETT Size (mm):  7.5  Measured from:  Teeth  ETT to Teeth (cm):  22  Placement Verified by: auscultation and capnometry    Cormack-Lehane Classification:  Grade I - full view of glottis  Number of Attempts at Approach:  1

## 2023-10-11 NOTE — ANESTHESIA PREPROCEDURE EVALUATION
Case: [id number] Date/Time: 10/11/23 0845    Procedure: ROBOTIC RIGHT INGUINAL HERNIA REPAIR, POSSIBLE BILATERAL (Groin)    Anesthesia type: General    Location:  OR  / SURGERY [facility]    Surgeons: Georgie Mejía M.D.          Relevant Problems   No relevant active problems       Physical Exam    Airway   Mallampati: II  TM distance: >3 FB  Neck ROM: full       Cardiovascular - normal exam  Rhythm: regular  Rate: normal  (-) murmur     Dental - normal exam           Pulmonary - normal exam  Breath sounds clear to auscultation     Abdominal    Neurological - normal exam                 Anesthesia Plan    ASA 2       Plan - general       Airway plan will be ETT          Induction: intravenous    Postoperative Plan: Postoperative administration of opioids is intended.    Pertinent diagnostic labs and testing reviewed    Informed Consent:    Anesthetic plan and risks discussed with patient.    Use of blood products discussed with: patient whom consented to blood products.

## 2023-10-11 NOTE — ANESTHESIA POSTPROCEDURE EVALUATION
Patient: Jaymie Haywood    Procedure Summary     Date: 10/11/23 Room / Location:  OR  / SURGERY [facility]    Anesthesia Start: 0926 Anesthesia Stop: 1043    Procedure: ROBOTIC RIGHT INGUINAL HERNIA REPAIR, POSSIBLE BILATERAL (Groin) Diagnosis: (right inguinal hernia )    Surgeons: Georgie Mejía M.D. Responsible Provider: Ruperto Silverio M.D.    Anesthesia Type: general ASA Status: 2          Final Anesthesia Type: general  Last vitals  BP   Blood Pressure: 138/84    Temp   36.7 °C (98.1 °F)    Pulse   71   Resp   16    SpO2   99 %      Anesthesia Post Evaluation    Patient location during evaluation: PACU  Patient participation: complete - patient participated  Level of consciousness: awake and alert  Pain score: 1    Airway patency: patent  Anesthetic complications: no  Cardiovascular status: hemodynamically stable  Respiratory status: acceptable  Hydration status: euvolemic    PONV: none          There were no known notable events for this encounter.     Nurse Pain Score: 4 (NPRS)

## 2023-10-11 NOTE — OR NURSING
0709 PT TO PRE OP TO ASSUME CARE    0808 Patient allergies and NPO status verified, home medication reconciliation completed and belongings secured. Patient verbalizes understanding of pain scale, expected course of stay and plan of care. Surgical site verified with patient. IV access established. Sequentials placed on B legs

## 2023-10-11 NOTE — OR NURSING
1035: Patient arrived to PACU from OR via gurney. Report received from anesthesia and RN. Respirations are spontaneous and unlabored. VSS on 6L. 3 lap sitesa are BETTY and CDI. Cold pack applied. OPA in place.    1040: OPA removed    1041: c/o 4/10 abd pain. See MAR, PO analgesia given    1052: family updated    1111: pt meets criteria for phase II. This RN to follow pt through.     1112: Patient arrived to phase II from PACU 1 via gurney. Respirations are spontaneous and unlabored. VSS on RA. 3 lap sites are BETTY and CDI.    1113: Family at bedside.     1128: Patient education completed, family denies further questions. DC'd to care of family post uneventful stay in PACU 2.

## 2023-10-11 NOTE — OP REPORT
DATE OF SERVICE:  10/11/2023     PREOPERATIVE DIAGNOSIS:  Right inguinal hernia.     POSTOPERATIVE DIAGNOSIS:  Right inguinal hernia.     PROCEDURES:  Robotic-assisted laparoscopic repair of right inguinal hernia   with mesh.  Excisional biopsy of enlarged suspicious iliac lymph node.     COMPLICATIONS:  None.     ESTIMATED BLOOD LOSS:  10 mL.     SPECIMENS:  Iliac lymph node.     DRAINS:  None.     PROPHYLAXIS:  IV Ancef, SCDs.     ASSISTANT:  Romain Licea PA-C; indications for an assistant were due to   the complexity of the procedure.  My assistant's role included aiding in   incisions, retraction, exposure, exchange of robotic devices and closure of   incisions.     PROCEDURE IN DETAIL:  The patient was consented preoperatively, taken to the   operating room, and placed in the supine position.  The anesthesia was   induced, and she was endotracheally intubated without difficulty.  Timeout was   performed confirming patient identifiers, planned procedure, and   administration of IV antibiotics just prior to incision.  An 8 mm transverse   incision was made at Lovell's point; through this a Veress needle placed.    After confirming intraperitoneal placement by saline drop test, the abdomen   was then insufflated to 12 mmHg, 8 mm trocar placed in this position.  Two   additional 8 mm trocars were placed across the upper abdomen.  The patient was   then placed in 15 degrees Trendelenburg.  The robot docked and control was   taken of the console.  The patient had a direct and indirect right inguinal   hernia containing preperitoneal fat.  No left inguinal hernia appreciated.    Beginning to the right of the midline, the median umbilical ligament was   divided sharply, peritoneum was incised with endoscopic scissors 2 cm above   the superior edge of the hernia defects extending from the median umbilical   ligament to the anterior superior iliac spine.  Peritoneal flap was then   mobilized inferiorly using blunt  and sharp dissection; inferior epigastric   vessels were exposed and the pubic symphysis identified.     Attachments along Zohaib's ligament were dissected bluntly.  Dissection   continued inferiorly to the iliopubic tract with care taken to avoid injury to   the femoral branch of the genitofemoral nerve and lateral femoral cutaneous   nerve.  Round ligament was reduced and then transected.  Dissection continued   inferiorly, and both hernia sacs were easily reduced with gentle traction.  An   8 x 13 piece of 3DMax, mid weight mesh was then placed to completely cover   the direct, indirect, and femoral spaces.  While reducing the indirect   retroperitoneal contents, enlarged firm lymph node was encountered; this was   carefully dissected free from the iliac vessels with sharp dissection and then   sent in formalin for permanent section.  After hemostasis was confirmed, the   peritoneal flap was closed over the mesh with a 2-0 Stratafix running suture.    All suture material was then removed.  After hemostasis was confirmed,   trocars were removed under direct visualization.  There was no bleeding from   the abdominal wall.  The abdomen was then desufflated.  Skin incisions were   then closed with a subcuticular stitch using 4-0 Monocryl and Dermabond.  Lap,   sponge, and instrument counts were correct at the end of the case x2.  The   patient tolerated the procedure well.  She was awakened from anesthesia,   extubated, and taken to the postanesthesia care unit in good condition.        ______________________________  MD WILBER Acosta/SAMMY    DD:  10/11/2023 10:37  DT:  10/11/2023 11:05    Job#:  [id number]

## 2023-10-11 NOTE — ANESTHESIA TIME REPORT
Anesthesia Start and Stop Event Times     Date Time Event    10/11/2023 0922 Ready for Procedure     0926 Anesthesia Start     1043 Anesthesia Stop        Responsible Staff  10/11/23    Name Role Begin End    Ruperto Silverio M.D. Anesth 0926 1043        Overtime Reason:  no overtime (within assigned shift)    Comments:

## 2023-10-11 NOTE — DISCHARGE INSTRUCTIONS
What to Expect Post Anesthesia    Rest and take it easy for the first 24 hours.  A responsible adult is recommended to remain with you during that time.  It is normal to feel sleepy.  We encourage you to not do anything that requires balance, judgment or coordination.    FOR 24 HOURS DO NOT:  Drive, operate machinery or run household appliances.  Drink beer or alcoholic beverages.  Make important decisions or sign legal documents.    To avoid nausea, slowly advance diet as tolerated, avoiding spicy or greasy foods for the first day.  Add more substantial food to your diet according to your provider's instructions.  Babies can be fed formula or breast milk as soon as they are hungry.  INCREASE FLUIDS AND FIBER TO AVOID CONSTIPATION.    MILD FLU-LIKE SYMPTOMS ARE NORMAL.  YOU MAY EXPERIENCE GENERALIZED MUSCLE ACHES, THROAT IRRITATION, HEADACHE AND/OR SOME NAUSEA.    If any questions arise, call your provider.  If your provider is not available, please feel free to call the [facility] at [phone].    MEDICATIONS: Resume taking daily medication.  Take prescribed pain medication with food.  If no medication is prescribed, you may take non-aspirin pain medication if needed.  PAIN MEDICATION CAN BE VERY CONSTIPATING.  Take a stool softener or laxative such as senokot, pericolace, or milk of magnesia if needed.    Last pain medication given at 10:41 AM, 5 mg Oxycodone.     Diet  Resume your normal diet as tolerated.  A diet low in cholesterol, fat, and sodium is recommended for good health.       Follow up with Dr. Mejía in office next week      Hernia Repair Discharge Instructions:    ACTIVITIES:   Upon discharge from the hospital, the day of surgery it is requested that you do no significant physical activity and limit mental activities, as you have had sedation. The day after surgery, you may resume activities of daily living, but for four weeks, it is recommended that you do no strenuous activities or heavy  lifting (greater than 20 pounds) for 6 weeks.    DRIVING:   You may drive whenever you are off pain medications and are able to perform the activities needed to drive, i.e. turning, bending, twisting, etc.     WOUND CARE:   It is not unusual for patients to experience swelling and even bruising at the hernia repair site. This will resolve over the next few days.  If you have skin glue to the wound, this will fall off on its own, do not pick at it. If you have steri strips to the wound, these will fall off on their own, do not pick at them, may trim the edges if needed.    Avoid getting lotions, powders or deodorant on the incision while it is healing. Don't worry if the area under either incision feels firm or hard. This is normal and usually softens within a few months.     ICE:  Use ice on the wound to decrease the swelling for the first 24 hours and then as needed for pain and swelling.     BATHING:   The dressing can be removed two days after surgery and the wound can then be wetted in a shower as normal, but avoid submersion in water (tub bath) for at least a week.    BOWEL FUNCTION:  After hernia repair, and with the use of narcotic pain medications, it is not uncommon for patients to experience constipation. This is due to decreasing activity levels as well as pain medications. You may wish to use a stool softener beginning immediately after surgery, and you may or may not need to use a laxative (Milk of Magnesia, Ex-lax; Senokot, etc.) as well.

## 2024-01-04 ENCOUNTER — APPOINTMENT (RX ONLY)
Dept: URBAN - METROPOLITAN AREA CLINIC 4 | Facility: CLINIC | Age: 59
Setting detail: DERMATOLOGY
End: 2024-01-04

## 2024-01-04 DIAGNOSIS — Z71.89 OTHER SPECIFIED COUNSELING: ICD-10-CM

## 2024-01-04 DIAGNOSIS — D22 MELANOCYTIC NEVI: ICD-10-CM

## 2024-01-04 PROBLEM — D22.5 MELANOCYTIC NEVI OF TRUNK: Status: ACTIVE | Noted: 2024-01-04

## 2024-01-04 PROCEDURE — 99213 OFFICE O/P EST LOW 20 MIN: CPT

## 2024-01-04 PROCEDURE — ? ADDITIONAL NOTES

## 2024-01-04 PROCEDURE — ? COUNSELING

## 2024-01-04 ASSESSMENT — LOCATION DETAILED DESCRIPTION DERM
LOCATION DETAILED: LEFT INFERIOR MEDIAL UPPER BACK
LOCATION DETAILED: RIGHT SUPERIOR UPPER BACK

## 2024-01-04 ASSESSMENT — LOCATION SIMPLE DESCRIPTION DERM
LOCATION SIMPLE: RIGHT UPPER BACK
LOCATION SIMPLE: LEFT UPPER BACK

## 2024-01-04 ASSESSMENT — LOCATION ZONE DERM: LOCATION ZONE: TRUNK

## 2024-01-04 NOTE — PROCEDURE: ADDITIONAL NOTES
Additional Notes: Will re-check the nevus of the back at f/u. Self skin monitoring with reporting. Unchanged today.
Detail Level: Simple
Render Risk Assessment In Note?: no

## 2024-06-28 ENCOUNTER — HOSPITAL ENCOUNTER (OUTPATIENT)
Dept: LAB | Facility: MEDICAL CENTER | Age: 59
End: 2024-06-28
Attending: STUDENT IN AN ORGANIZED HEALTH CARE EDUCATION/TRAINING PROGRAM
Payer: COMMERCIAL

## 2024-06-28 ENCOUNTER — OFFICE VISIT (OUTPATIENT)
Dept: MEDICAL GROUP | Facility: MEDICAL CENTER | Age: 59
End: 2024-06-28
Payer: COMMERCIAL

## 2024-06-28 VITALS
HEIGHT: 63 IN | WEIGHT: 178 LBS | OXYGEN SATURATION: 96 % | TEMPERATURE: 97.9 F | BODY MASS INDEX: 31.54 KG/M2 | HEART RATE: 86 BPM | DIASTOLIC BLOOD PRESSURE: 70 MMHG | SYSTOLIC BLOOD PRESSURE: 120 MMHG | RESPIRATION RATE: 16 BRPM

## 2024-06-28 DIAGNOSIS — Z11.59 NEED FOR HEPATITIS C SCREENING TEST: ICD-10-CM

## 2024-06-28 DIAGNOSIS — Z13.1 SCREENING FOR DIABETES MELLITUS: ICD-10-CM

## 2024-06-28 DIAGNOSIS — Z11.4 ENCOUNTER FOR SCREENING FOR HIV: ICD-10-CM

## 2024-06-28 DIAGNOSIS — M25.511 CHRONIC RIGHT SHOULDER PAIN: ICD-10-CM

## 2024-06-28 DIAGNOSIS — E66.09 CLASS 1 OBESITY DUE TO EXCESS CALORIES WITH SERIOUS COMORBIDITY AND BODY MASS INDEX (BMI) OF 31.0 TO 31.9 IN ADULT: ICD-10-CM

## 2024-06-28 DIAGNOSIS — E78.49 OTHER HYPERLIPIDEMIA: ICD-10-CM

## 2024-06-28 DIAGNOSIS — M54.50 ACUTE MIDLINE LOW BACK PAIN WITHOUT SCIATICA: ICD-10-CM

## 2024-06-28 DIAGNOSIS — G89.29 CHRONIC RIGHT SHOULDER PAIN: ICD-10-CM

## 2024-06-28 PROBLEM — E66.811 CLASS 1 OBESITY DUE TO EXCESS CALORIES WITH SERIOUS COMORBIDITY AND BODY MASS INDEX (BMI) OF 31.0 TO 31.9 IN ADULT: Status: ACTIVE | Noted: 2024-06-28

## 2024-06-28 LAB
ALBUMIN SERPL BCP-MCNC: 4.5 G/DL (ref 3.2–4.9)
ALBUMIN/GLOB SERPL: 1.4 G/DL
ALP SERPL-CCNC: 164 U/L (ref 30–99)
ALT SERPL-CCNC: 30 U/L (ref 2–50)
ANION GAP SERPL CALC-SCNC: 12 MMOL/L (ref 7–16)
AST SERPL-CCNC: 27 U/L (ref 12–45)
BILIRUB SERPL-MCNC: 0.4 MG/DL (ref 0.1–1.5)
BUN SERPL-MCNC: 16 MG/DL (ref 8–22)
CALCIUM ALBUM COR SERPL-MCNC: 9.7 MG/DL (ref 8.5–10.5)
CALCIUM SERPL-MCNC: 10.1 MG/DL (ref 8.4–10.2)
CHLORIDE SERPL-SCNC: 106 MMOL/L (ref 96–112)
CHOLEST SERPL-MCNC: 244 MG/DL (ref 100–199)
CO2 SERPL-SCNC: 25 MMOL/L (ref 20–33)
CREAT SERPL-MCNC: 0.89 MG/DL (ref 0.5–1.4)
EST. AVERAGE GLUCOSE BLD GHB EST-MCNC: 117 MG/DL
FASTING STATUS PATIENT QL REPORTED: NORMAL
GFR SERPLBLD CREATININE-BSD FMLA CKD-EPI: 75 ML/MIN/1.73 M 2
GLOBULIN SER CALC-MCNC: 3.2 G/DL (ref 1.9–3.5)
GLUCOSE SERPL-MCNC: 100 MG/DL (ref 65–99)
HBA1C MFR BLD: 5.7 % (ref 4–5.6)
HCV AB SER QL: NORMAL
HDLC SERPL-MCNC: 68 MG/DL
HIV 1+2 AB+HIV1 P24 AG SERPL QL IA: NORMAL
LDLC SERPL CALC-MCNC: 143 MG/DL
POTASSIUM SERPL-SCNC: 4.9 MMOL/L (ref 3.6–5.5)
PROT SERPL-MCNC: 7.7 G/DL (ref 6–8.2)
SODIUM SERPL-SCNC: 143 MMOL/L (ref 135–145)
TRIGL SERPL-MCNC: 166 MG/DL (ref 0–149)

## 2024-06-28 PROCEDURE — 87389 HIV-1 AG W/HIV-1&-2 AB AG IA: CPT

## 2024-06-28 PROCEDURE — 80053 COMPREHEN METABOLIC PANEL: CPT

## 2024-06-28 PROCEDURE — 86803 HEPATITIS C AB TEST: CPT

## 2024-06-28 PROCEDURE — 83036 HEMOGLOBIN GLYCOSYLATED A1C: CPT

## 2024-06-28 PROCEDURE — 80061 LIPID PANEL: CPT

## 2024-06-28 PROCEDURE — 36415 COLL VENOUS BLD VENIPUNCTURE: CPT

## 2024-06-28 RX ORDER — TIRZEPATIDE 2.5 MG/.5ML
2.5 INJECTION, SOLUTION SUBCUTANEOUS
Qty: 2 ML | Refills: 0 | Status: SHIPPED | OUTPATIENT
Start: 2024-06-28

## 2024-06-28 NOTE — PROGRESS NOTES
"Subjective:     CC: Back pain, shoulder pain, obesity with comorbidities    HPI:   Jaymie presents today with several concerns.  The first is chronic shoulder pain.  This is on the right shoulder and has been bothering her for several years.  There is a sensation of spasm or locking in the right shoulder with certain movements, mostly on the anterior aspect.  She is wondering what next step would be for this.    She also has acute back pain.  This is a recurrent condition.  She is wondering what she can do at home to help with this.    She has also been working on weight loss.  She has spent greater than 6 months decreasing portion sizes, cutting out soda, minimizing alcohol and increasing exercise.  She has been walking and doing Pilates.  She has not lost any weight, in fact has gained some weight and is hoping to start a medication.    ROS:  ROS    Objective:     Exam:  /70   Pulse 86   Temp 36.6 °C (97.9 °F) (Temporal)   Resp 16   Ht 1.6 m (5' 3\")   Wt 80.7 kg (178 lb)   SpO2 96%   BMI 31.53 kg/m²  Body mass index is 31.53 kg/m².    Physical Exam  Vitals reviewed.   Constitutional:       General: She is not in acute distress.     Appearance: She is not toxic-appearing.      Comments: Exam through observation only   HENT:      Head: Normocephalic and atraumatic.      Right Ear: External ear normal.      Left Ear: External ear normal.   Eyes:      General:         Right eye: No discharge.         Left eye: No discharge.      Extraocular Movements: Extraocular movements intact.      Conjunctiva/sclera: Conjunctivae normal.   Pulmonary:      Effort: Pulmonary effort is normal. No respiratory distress.   Skin:     General: Skin is warm and dry.   Neurological:      Mental Status: She is alert.   Psychiatric:         Mood and Affect: Mood normal.         Behavior: Behavior normal.         Thought Content: Thought content normal.         Judgment: Judgment normal.           Assessment & Plan:     58 y.o. " female with the following -     1. Class 1 obesity due to excess calories with serious comorbidity and body mass index (BMI) of 31.0 to 31.9 in adult  We discussed the risk and benefits of starting Zepbound.  She has obesity with comorbid hyperlipidemia as well as right knee osteoarthritis.  She has not had successful weight loss with dietary changes and exercise but has been going on for about 6 months  - Tirzepatide-Weight Management (ZEPBOUND) 2.5 MG/0.5ML Solution Auto-injector; Inject 2.5 mg under the skin every 7 days.  Dispense: 2 mL; Refill: 0    2. Acute midline low back pain without sciatica  We discussed using anti-inflammatories, active rest and ice    3. Chronic right shoulder pain  Referral to physical therapy sent, if this does not help we can proceed with imaging  - Referral to Physical Therapy    4. Other hyperlipidemia  - Lipid Profile; Future  - Comp Metabolic Panel; Future    5. Screening for diabetes mellitus  - HEMOGLOBIN A1C; Future    6. Need for hepatitis C screening test  - HEP C VIRUS ANTIBODY; Future    7. Encounter for screening for HIV  - HIV AG/AB COMBO ASSAY SCREENING; Future      No follow-ups on file.    Please note that this dictation was created using voice recognition software. I have made every reasonable attempt to correct obvious errors, but I expect that there are errors of grammar and possibly content that I did not discover before finalizing the note.

## 2024-07-01 PROBLEM — R73.03 PREDIABETES: Status: ACTIVE | Noted: 2024-07-01

## 2024-07-02 ENCOUNTER — TELEPHONE (OUTPATIENT)
Dept: MEDICAL GROUP | Facility: MEDICAL CENTER | Age: 59
End: 2024-07-02
Payer: COMMERCIAL

## 2024-07-09 ENCOUNTER — TELEPHONE (OUTPATIENT)
Dept: MEDICAL GROUP | Facility: MEDICAL CENTER | Age: 59
End: 2024-07-09
Payer: COMMERCIAL

## 2024-07-22 ENCOUNTER — PHYSICAL THERAPY (OUTPATIENT)
Dept: PHYSICAL THERAPY | Facility: MEDICAL CENTER | Age: 59
End: 2024-07-22
Attending: STUDENT IN AN ORGANIZED HEALTH CARE EDUCATION/TRAINING PROGRAM
Payer: COMMERCIAL

## 2024-07-22 DIAGNOSIS — G89.29 CHRONIC RIGHT SHOULDER PAIN: ICD-10-CM

## 2024-07-22 DIAGNOSIS — R73.03 PREDIABETES: ICD-10-CM

## 2024-07-22 DIAGNOSIS — E78.49 OTHER HYPERLIPIDEMIA: ICD-10-CM

## 2024-07-22 DIAGNOSIS — E66.09 CLASS 1 OBESITY DUE TO EXCESS CALORIES WITH SERIOUS COMORBIDITY AND BODY MASS INDEX (BMI) OF 31.0 TO 31.9 IN ADULT: ICD-10-CM

## 2024-07-22 DIAGNOSIS — M25.511 CHRONIC RIGHT SHOULDER PAIN: ICD-10-CM

## 2024-07-22 PROCEDURE — 97162 PT EVAL MOD COMPLEX 30 MIN: CPT

## 2024-07-22 PROCEDURE — 97110 THERAPEUTIC EXERCISES: CPT

## 2024-07-22 RX ORDER — TIRZEPATIDE 5 MG/.5ML
5 INJECTION, SOLUTION SUBCUTANEOUS
Qty: 2 ML | Refills: 0 | Status: SHIPPED | OUTPATIENT
Start: 2024-07-22

## 2024-07-22 ASSESSMENT — ENCOUNTER SYMPTOMS
PAIN SCALE AT LOWEST: 0
PAIN SCALE AT HIGHEST: 7
PAIN SCALE: 1
PAIN TIMING: INTERMITTENT

## 2024-07-24 ENCOUNTER — PHYSICAL THERAPY (OUTPATIENT)
Dept: PHYSICAL THERAPY | Facility: MEDICAL CENTER | Age: 59
End: 2024-07-24
Attending: STUDENT IN AN ORGANIZED HEALTH CARE EDUCATION/TRAINING PROGRAM
Payer: COMMERCIAL

## 2024-07-24 DIAGNOSIS — M25.511 CHRONIC RIGHT SHOULDER PAIN: ICD-10-CM

## 2024-07-24 DIAGNOSIS — G89.29 CHRONIC RIGHT SHOULDER PAIN: ICD-10-CM

## 2024-07-24 PROCEDURE — 97140 MANUAL THERAPY 1/> REGIONS: CPT

## 2024-07-24 PROCEDURE — 97012 MECHANICAL TRACTION THERAPY: CPT

## 2024-07-24 PROCEDURE — 97110 THERAPEUTIC EXERCISES: CPT

## 2024-07-30 ENCOUNTER — PHYSICAL THERAPY (OUTPATIENT)
Dept: PHYSICAL THERAPY | Facility: MEDICAL CENTER | Age: 59
End: 2024-07-30
Attending: STUDENT IN AN ORGANIZED HEALTH CARE EDUCATION/TRAINING PROGRAM
Payer: COMMERCIAL

## 2024-07-30 DIAGNOSIS — M25.511 CHRONIC RIGHT SHOULDER PAIN: ICD-10-CM

## 2024-07-30 DIAGNOSIS — G89.29 CHRONIC RIGHT SHOULDER PAIN: ICD-10-CM

## 2024-07-30 PROCEDURE — 97140 MANUAL THERAPY 1/> REGIONS: CPT

## 2024-07-30 PROCEDURE — 97012 MECHANICAL TRACTION THERAPY: CPT

## 2024-07-30 PROCEDURE — 97110 THERAPEUTIC EXERCISES: CPT

## 2024-08-19 ENCOUNTER — PHYSICAL THERAPY (OUTPATIENT)
Dept: PHYSICAL THERAPY | Facility: MEDICAL CENTER | Age: 59
End: 2024-08-19
Attending: STUDENT IN AN ORGANIZED HEALTH CARE EDUCATION/TRAINING PROGRAM
Payer: COMMERCIAL

## 2024-08-19 DIAGNOSIS — G89.29 CHRONIC RIGHT SHOULDER PAIN: ICD-10-CM

## 2024-08-19 DIAGNOSIS — M25.511 CHRONIC RIGHT SHOULDER PAIN: ICD-10-CM

## 2024-08-19 PROCEDURE — 97140 MANUAL THERAPY 1/> REGIONS: CPT

## 2024-08-19 PROCEDURE — 97110 THERAPEUTIC EXERCISES: CPT

## 2024-08-19 NOTE — OP THERAPY DAILY TREATMENT
"  Outpatient Physical Therapy  DAILY TREATMENT     [facility] Outpatient Physical Therapy  [address]  [place] [address]  Phone:  [phone]  Fax:  [phone]    Date: 08/19/2024    Patient: Jaymie Haywood  YOB: 1965  MRN: [id number]     Time Calculation    Start time: 1415  Stop time: 1516 Time Calculation (min): 61 minutes         Chief Complaint: Shoulder Problem    Visit #: 4    SUBJECTIVE:  Feels better in re: to the shoulder. Notices the s/s are better, less frequent. Will still notice the s/s with the seat belt; will notice twisting will bring on s/s (both towards and away from the shoulder). Has improvements in sleeping, about 20% improvement.    OBJECTIVE:  Current objective measures:    strength- measured in #: Today (R hand dominance)  L: 50, 41, 59   R: 49, 50, 40      Active Range of Motion from eval  Cervical Spine   Flexion: within functional limits  Extension: within functional limits  Retraction: decreased  Left lateral flexion: within functional limits  Right lateral flexion: within functional limits  Left rotation: Active left cervical rotation: 52 deg.  Right rotation: Active right cervical rotation: \"tighter\" in neck; 50 deg.     Joint Play   Spine     Central PA [place]        C5: hypomobile       C6: hypomobile       C7: hypomobile       C8: hypomobile       T1: hypomobile      Therapeutic Exercises (CPT 66159):     1. UBE, x3 min fwd, cardiovascular warm up    2. open book pec stretch, t/s extensions over FR, x15, hep    3. posture correction, x1 min, NT    4. SL t/s rotation, x10 ea, NT    5. median n. glides, x10 ea, NT    6. ulnar n. glides, x10 ea, NT    7. radial n. glides, x10 ea, NT    8. GH extensions with dowel, x10 1 sec hold, NT    9. verbal review of all hep    10. c/s retractions, x3, hep    11. seated flasher, 2x1 min orange TB, incr fatigue 2nd round; hep      Therapeutic Exercise Summary: Access Code: " [email or web address]  URL: [email or web address]  Date: 07/22/2024  Prepared by: Johnny Manning    Exercises  - Correct Seated Posture  - 7 x weekly  - Sidelying Thoracic Lumbar Rotation  - 2 x daily - 7 x weekly - 1 sets - 10-15 reps  - Standing Median Nerve Glide  - 3 x daily - 7 x weekly - 1 sets - 10-15 reps  - Ulnar Nerve Glide- Full Arm  - 3 x daily - 7 x weekly - 1 sets - 10-15 reps  - Standing Radial Nerve Glide  - 3 x daily - 7 x weekly - 1 sets - 10-15 reps    Pt performed these exercises with instruction and SPV.  Provided handout with these exercises for daily HEP.        Therapeutic Treatments and Modalities:     1. Manual Therapy (CPT 71053), see below    2. Mechanical Traction (CPT 45691), 30/15# mech traction c/s with mhp x15 min, increased parameters    Therapeutic Treatment and Modalities Summary: Manual:  CPA and rotational mobs CTJ-T10 gr III in prone; then R scapular mobs all directions Grade III-IV with pt L SL (pillow b/w knees for comfort) and pillow barrier between pt and therapist followed by R subscap release 2x10.   Then, STM to pec R. //improved R GH horizontal abd before discomfort     Time-based treatments/modalities:    Physical Therapy Timed Treatment Charges  Manual therapy minutes (CPT 62121): 26 minutes  Therapeutic exercise minutes (CPT 91947): 20 minutes    Pain rating (1-10) before treatment:  2/10 tightness R shoulder with movement; 0/10 at rest  Pain rating (1-10) after treatment:    Pain Comments::  Aggravating: sleeping on the R, reaching across the body, cleaning floors on hands and knees, cleaning dishes     ASSESSMENT:   Response to treatment:   Sig improvement in GH horizontal abd following manual, dominick to R scapula. Sig hypersensitive at R pec but without reproduction of RUE s/s with palpation. Good trajectory overall; will complete PN for cont'd PT needs next visit after pt's vacation.     Additionally, increased c/s traction parameters today.      PLAN/RECOMMENDATIONS:    Plan for treatment: therapy treatment to continue next visit.  Planned interventions for next visit: continue with current treatment.  PN due next visit

## 2024-08-22 ENCOUNTER — APPOINTMENT (OUTPATIENT)
Dept: PHYSICAL THERAPY | Facility: MEDICAL CENTER | Age: 59
End: 2024-08-22
Attending: STUDENT IN AN ORGANIZED HEALTH CARE EDUCATION/TRAINING PROGRAM
Payer: COMMERCIAL

## 2024-08-26 ENCOUNTER — TELEPHONE (OUTPATIENT)
Dept: MEDICAL GROUP | Facility: MEDICAL CENTER | Age: 59
End: 2024-08-26
Payer: COMMERCIAL

## 2024-08-26 ENCOUNTER — PHYSICAL THERAPY (OUTPATIENT)
Dept: PHYSICAL THERAPY | Facility: MEDICAL CENTER | Age: 59
End: 2024-08-26
Attending: STUDENT IN AN ORGANIZED HEALTH CARE EDUCATION/TRAINING PROGRAM
Payer: COMMERCIAL

## 2024-08-26 DIAGNOSIS — G89.29 CHRONIC RIGHT SHOULDER PAIN: ICD-10-CM

## 2024-08-26 DIAGNOSIS — R73.03 PREDIABETES: ICD-10-CM

## 2024-08-26 DIAGNOSIS — M79.641 RIGHT HAND PAIN: ICD-10-CM

## 2024-08-26 DIAGNOSIS — E78.49 OTHER HYPERLIPIDEMIA: ICD-10-CM

## 2024-08-26 DIAGNOSIS — E66.09 CLASS 1 OBESITY DUE TO EXCESS CALORIES WITH SERIOUS COMORBIDITY AND BODY MASS INDEX (BMI) OF 31.0 TO 31.9 IN ADULT: ICD-10-CM

## 2024-08-26 DIAGNOSIS — M25.511 CHRONIC RIGHT SHOULDER PAIN: ICD-10-CM

## 2024-08-26 PROCEDURE — 97012 MECHANICAL TRACTION THERAPY: CPT

## 2024-08-26 PROCEDURE — 97110 THERAPEUTIC EXERCISES: CPT

## 2024-08-26 PROCEDURE — 97140 MANUAL THERAPY 1/> REGIONS: CPT

## 2024-08-26 RX ORDER — TIRZEPATIDE 5 MG/.5ML
5 INJECTION, SOLUTION SUBCUTANEOUS
Qty: 2 ML | Refills: 3 | Status: SHIPPED | OUTPATIENT
Start: 2024-08-26

## 2024-08-26 NOTE — OP THERAPY DAILY TREATMENT
Outpatient Physical Therapy  DAILY TREATMENT     [facility] Outpatient Physical Therapy  [address]  [place] [address]  Phone:  [phone]  Fax:  [phone]    Date: 08/26/2024    Patient: Jaymie Haywood  YOB: 1965  MRN: [id number]     Time Calculation    Start time: 0901  Stop time: 0958 Time Calculation (min): 57 minutes         Chief Complaint: Shoulder Problem    Visit #: 5    SUBJECTIVE:  Noticed her shoulder has been feeling better. Has increased pilates freq to 3-4/wk.   Stating she was sore from last session but feels the txt made a difference.     OBJECTIVE:  Current objective measures:     See PN       Therapeutic Exercises (CPT 81644):     1. UBE, x3 min fwd, cardiovascular warm up    2. open book pec stretch, t/s extensions over FR, x15, reviewed    3. posture correction, x1 min, NT    4. SL t/s rotation, x10 ea, NT    5. median n. glides, x10 ea, NT    6. ulnar n. glides, x10 ea, NT    7. radial n. glides, x10 ea, NT    8. GH extensions with dowel, x10 1 sec hold, NT    9. verbal review of all hep    10. c/s retractions, x3, hep    11. seated flasher, 2x1 min orange TB, incr fatigue 2nd round; hep      Therapeutic Exercise Summary: Access Code: [email or web address]  URL: [email or web address]  Date: 07/22/2024  Prepared by: Johnny Manning    Exercises  - Correct Seated Posture  - 7 x weekly  - Sidelying Thoracic Lumbar Rotation  - 2 x daily - 7 x weekly - 1 sets - 10-15 reps  - Standing Median Nerve Glide  - 3 x daily - 7 x weekly - 1 sets - 10-15 reps  - Ulnar Nerve Glide- Full Arm  - 3 x daily - 7 x weekly - 1 sets - 10-15 reps  - Standing Radial Nerve Glide  - 3 x daily - 7 x weekly - 1 sets - 10-15 reps    Pt performed these exercises with instruction and SPV.  Provided handout with these exercises for daily HEP.        Therapeutic Treatments and Modalities:     1. Manual Therapy (CPT 71898), see below    2. Mechanical Traction (CPT  64047), 32/15[facility] traction c/s with mhp x15 min, increased parameters    Therapeutic Treatment and Modalities Summary: Manual:  CPA and rotational mobs CTJ-T10 gr III in prone; then R scapular mobs all directions Grade III-IV with pt L SL (pillow b/w knees for comfort) and pillow barrier between pt and therapist followed by R subscap release 2x10.   Then, STM to pec R. //improved R GH horizontal abd before discomfort     Time-based treatments/modalities:    Physical Therapy Timed Treatment Charges  Manual therapy minutes (CPT 95033): 12 minutes  Therapeutic exercise minutes (CPT 82755): 32 minutes    Pain rating (1-10) before treatment: sore in RUE into thumb  Pain Comments::  Aggravating: sleeping on the R, reaching across the body, cleaning floors on hands and knees, cleaning dishes     ASSESSMENT:   Response to treatment:   See PN     PLAN/RECOMMENDATIONS:   Plan for treatment: therapy treatment to continue next visit.  Planned interventions for next visit: continue with current treatment.  Assess R sternum, clavicular, and first rib motion

## 2024-08-26 NOTE — OP THERAPY PROGRESS SUMMARY
"  Outpatient Physical Therapy  PROGRESS SUMMARY NOTE      [facility] Outpatient Physical Therapy  [address]  [place] [address]  Phone:  [phone]  Fax:  [phone]    Date of Visit: 08/26/2024    Patient: Jaymie Haywood  YOB: 1965  MRN: [id number]     Referring Provider: Martine Campos M.D.  [address]  [address]  [place] [address]   Referring Diagnosis Other chronic pain [G89.29];Pain in right shoulder [M25.511]     Visit Diagnoses     ICD-10-CM   1. Chronic right shoulder pain  M25.511    G89.29       Rehab Potential: good    Progress Report Period: 7/22/24-8/26/24    Functional Assessment Used  Quickdash General Total Score: 38.64       Objective Findings and Assessment:   Patient progression towards goals: Pt has been seen for 5 visits. Reporting improvements in her sleep, donning seatbelt, and overall UE ROM. Pt used to have 3 spasms at her chest daily; stating she now only has them 3-4x/week. Has had significant benefit from the manual, traction, strengthening and mobility exercises. Pt does continue to experience discomfort in her R hand; had surgery for trigger finger, this has overall not improved drastically; she may benefit from referral to OT. Will reach out to pt's PCP re: this referral. Pt is on good trajectory in PT and will benefit from cont'd sessions to address remaining impairments.           Objective findings and assessment details: Active Range of Motion   Cervical Spine   Flexion: within functional limits  Extension: within functional limits  Retraction: decreased  Left lateral flexion: within functional limits  Right lateral flexion: within functional limits  Left rotation: Active left cervical rotation: 47 deg.  Right rotation: Active right cervical rotation: \"tighter\" in neck; 45 deg.     Joint Play   Spine     Central PA [place]        C5: hypomobile       C6: hypomobile       C7: hypomobile       C8: " hypomobile       T1: hypomobile    Goals:   Short Term Goals:   1. Pt will be independent with written HEP. (Met)  2. Therapist to conduct formal  strength assessment. (Met; WFL)        Short term goal time span:  2-4 weeks      Long Term Goals:    1. Pt will be independent with written HEP. (Ongoing/partially met)   2. Pt will have a sig improvement in Quickdash score (eval: 25) (Not met)   3. Pt will have 80% or greater improvement in sleep hygiene. (Partially met; about 25% improved)   4. Pt will be able to reach across body without incr in s/s consistently do don seatbelt. (Partially met; about 25% met)  5. Pt will be able to assume Qped without incr in s/s in order to clean floors and return to PLOF. (Partially met)    Long term goal time span:  6-8 weeks    Plan:   Planned therapy interventions:  E Stim Unattended (CPT 96964), Therapeutic Exercise (CPT 14872), Therapeutic Activities (CPT 63967), Manual Therapy (CPT 70152), Neuromuscular Re-education (CPT 97726) and Mechanical Traction (CPT 81560)  Frequency: 1-2x/wk.  Duration in weeks:  6  Plan details:  UPOC: 10/11/24      Referring provider co-signature:  I have reviewed this plan of care and my co-signature certifies the need for services.     Certification Period: 08/26/2024 to 10/11/24     Physician Signature: ________________________________ Date: ______________

## 2024-08-26 NOTE — TELEPHONE ENCOUNTER
Pt is asking for a refill on   Tirzepatide-Weight Management (ZEPBOUND) 5 MG/0.5ML  she gets it prescribed monthly due to a coupon she receives. Thank you!

## 2024-08-29 ENCOUNTER — APPOINTMENT (OUTPATIENT)
Dept: PHYSICAL THERAPY | Facility: MEDICAL CENTER | Age: 59
End: 2024-08-29
Attending: STUDENT IN AN ORGANIZED HEALTH CARE EDUCATION/TRAINING PROGRAM
Payer: COMMERCIAL

## 2024-09-04 ENCOUNTER — PHYSICAL THERAPY (OUTPATIENT)
Dept: PHYSICAL THERAPY | Facility: MEDICAL CENTER | Age: 59
End: 2024-09-04
Attending: STUDENT IN AN ORGANIZED HEALTH CARE EDUCATION/TRAINING PROGRAM
Payer: COMMERCIAL

## 2024-09-04 DIAGNOSIS — M25.511 CHRONIC RIGHT SHOULDER PAIN: ICD-10-CM

## 2024-09-04 DIAGNOSIS — G89.29 CHRONIC RIGHT SHOULDER PAIN: ICD-10-CM

## 2024-09-04 PROCEDURE — 97140 MANUAL THERAPY 1/> REGIONS: CPT

## 2024-09-04 PROCEDURE — 97012 MECHANICAL TRACTION THERAPY: CPT

## 2024-09-04 NOTE — OP THERAPY DAILY TREATMENT
Outpatient Physical Therapy  DAILY TREATMENT     [facility] Outpatient Physical Therapy  [address]  [place] [address]  Phone:  [phone]  Fax:  [phone]    Date: 09/04/2024    Patient: Jaymie Hyawood  YOB: 1965  MRN: [id number]     Time Calculation    Start time: 1213  Stop time: 1302 Time Calculation (min): 49 minutes         Chief Complaint: Shoulder Problem    Visit #: 6    SUBJECTIVE:  Pt stating she felt great until getting out of a chair in a restaurant and then felt a pull in the shoulder on Saturday. Has felt increased tightness since this incident.    OBJECTIVE:  Current objective measures:           Therapeutic Exercises (CPT 83085):     1. UBE, x3 min fwd, cardiovascular warm up    2. open book pec stretch, t/s extensions over FR, x15, reviewed    3. posture correction, x1 min, NT    4. SL t/s rotation, x10 ea, NT    5. median n. glides, x10 ea, NT    6. ulnar n. glides, x10 ea, NT    7. radial n. glides, x10 ea, NT    8. GH extensions with dowel, x10 1 sec hold, NT    9. verbal review of all hep    10. c/s retractions, x3, hep    11. seated flasher, 2x1 min orange TB, incr fatigue 2nd round; hep      Therapeutic Exercise Summary: Access Code: [email or web address]  URL: [email or web address]  Date: 07/22/2024  Prepared by: Johnny Manning    Exercises  - Correct Seated Posture  - 7 x weekly  - Sidelying Thoracic Lumbar Rotation  - 2 x daily - 7 x weekly - 1 sets - 10-15 reps  - Standing Median Nerve Glide  - 3 x daily - 7 x weekly - 1 sets - 10-15 reps  - Ulnar Nerve Glide- Full Arm  - 3 x daily - 7 x weekly - 1 sets - 10-15 reps  - Standing Radial Nerve Glide  - 3 x daily - 7 x weekly - 1 sets - 10-15 reps    Pt performed these exercises with instruction and SPV.  Provided handout with these exercises for daily HEP.        Therapeutic Treatments and Modalities:     1. Manual Therapy (CPT 11589), see below    2. Mechanical Traction (CPT  43012), 35/15# [facility] traction c/s with mhp x15 min, tingling in R hand-pt educated to monitor    Therapeutic Treatment and Modalities Summary: Manual:  CPA and rotational mobs CTJ-T10 gr III in prone; then R scapular mobs all directions Grade III-IV with pt L SL (pillow b/w knees for comfort) and pillow barrier between pt and therapist followed by R subscap release 2x10.   Then, STM to pec R. //improved R GH horizontal abd before discomfort     Time-based treatments/modalities:    Physical Therapy Timed Treatment Charges  Manual therapy minutes (CPT 01678): 34 minutes  Therapeutic exercise minutes (CPT 91276): 2 minutes    Pain rating (1-10) before treatment: notices tightness in anterior R shoulder  Pain Comments::  Aggravating: sleeping on the R, reaching across the body, cleaning floors on hands and knees, cleaning dishes     ASSESSMENT:   Response to treatment:   Decreased tightness noted in GHJ following manual in session. Does notice a lot of stiffness in R pec, where she normally experiences painful spasms-may introduce cupping to this area in next visit. Soreness and hypomobility at R>L SCJ -may benefit from cont'd manual to this area.    PLAN/RECOMMENDATIONS:   Plan for treatment: therapy treatment to continue next visit.  Planned interventions for next visit: continue with current treatment.  manual R sternum, clavicular, and first rib motion   Cupping to R pec?  Response to [facility] traction

## 2024-09-18 ENCOUNTER — APPOINTMENT (OUTPATIENT)
Dept: OCCUPATIONAL THERAPY | Facility: MEDICAL CENTER | Age: 59
End: 2024-09-18
Attending: STUDENT IN AN ORGANIZED HEALTH CARE EDUCATION/TRAINING PROGRAM
Payer: COMMERCIAL

## 2024-09-23 ENCOUNTER — OCCUPATIONAL THERAPY (OUTPATIENT)
Dept: OCCUPATIONAL THERAPY | Facility: MEDICAL CENTER | Age: 59
End: 2024-09-23
Attending: STUDENT IN AN ORGANIZED HEALTH CARE EDUCATION/TRAINING PROGRAM
Payer: COMMERCIAL

## 2024-09-23 ENCOUNTER — TELEPHONE (OUTPATIENT)
Dept: MEDICAL GROUP | Facility: MEDICAL CENTER | Age: 59
End: 2024-09-23
Payer: COMMERCIAL

## 2024-09-23 DIAGNOSIS — R73.03 PREDIABETES: ICD-10-CM

## 2024-09-23 DIAGNOSIS — M79.641 RIGHT HAND PAIN: ICD-10-CM

## 2024-09-23 DIAGNOSIS — E78.49 OTHER HYPERLIPIDEMIA: ICD-10-CM

## 2024-09-23 DIAGNOSIS — E66.09 CLASS 1 OBESITY DUE TO EXCESS CALORIES WITH SERIOUS COMORBIDITY AND BODY MASS INDEX (BMI) OF 31.0 TO 31.9 IN ADULT: ICD-10-CM

## 2024-09-23 PROCEDURE — 97530 THERAPEUTIC ACTIVITIES: CPT

## 2024-09-23 PROCEDURE — 97165 OT EVAL LOW COMPLEX 30 MIN: CPT

## 2024-09-23 RX ORDER — TIRZEPATIDE 5 MG/.5ML
5 INJECTION, SOLUTION SUBCUTANEOUS
Qty: 2 ML | Refills: 3 | Status: CANCELLED | OUTPATIENT
Start: 2024-09-23

## 2024-09-23 ASSESSMENT — ENCOUNTER SYMPTOMS
TACTILE HYPERESTHESIA: 1
QUALITY: THROBBING
PAIN SCALE AT LOWEST: 1
QUALITY: CRAMPING
PAIN SCALE: 1
PAIN SCALE AT HIGHEST: 7

## 2024-09-23 NOTE — TELEPHONE ENCOUNTER
Problem: Pain  Goal: # Verbalizes understanding of pain management  Description: Documented in Patient Education Activity  Outcome: Outcome Met, Continue evaluating goal progress toward completion     Problem: At Risk for Falls  Goal: # Patient does not fall  Outcome: Outcome Met, Continue evaluating goal progress toward completion      PT IS ASKING FOR A REFILL ON Tirzepatide-Weight Management (ZEPBOUND) 5 MG/0.5ML . SHE GETS IT FILLED MONTHLY DUE TO INSURANCE AND DR PARMAR IS AWARE. PLEASE REFILL FOR ONLY ONE MONTH. THANK YOU!

## 2024-09-23 NOTE — OP THERAPY EVALUATION
Outpatient Occupational Therapy  HAND THERAPY INITIAL EVALUATION    [facility] Outpatient Occupational Therapy  [address]  [place] [address]  Phone:  [phone]  Fax:  [phone]    Date of Evaluation: 2024    Patient: Jaymie Haywood  YOB: 1965  MRN: [id number]     Referring Provider: Martine Campos M.D.  [address]  [address]  [place] [address]   Referring Diagnosis Right hand pain [M79.641]     Time Calculation    Start time: 930  Stop time: 1015 Time Calculation (min): 45 minutes             Chief Complaint: Hand Problem    Visit Diagnoses     ICD-10-CM   1. Right hand pain  M79.641       Subjective:   History of Present Illness:     Mechanism of injury:  Patient has long history of R hand pain/complications, reports trigger finger for many years which she got steroid injections for, she ended up having right index, middle, and ring trigger finger surgical release on 2023. Continued to have pain and had additional trigger finger injection R middle finger , she did not feel this helped at all. At that same appt Dr. Gayle indicated possible Dupuytren's pre-tendinous cord to the right middle finger which is asymptomatic and without contractures.    Jaymie reports that she has hand pain daily, typically feels stiff and painful in the AM, she also has pain and feels hand gets stuck after gripping and any hand use.     Sleep disturbance:  Interrupted sleep (tingling in R at night, hand cramping at night)  Pain:     Current pain ratin    At best pain ratin    At worst pain ratin    Quality:  Cramping and throbbing    Pain Comments::  Worse in the AM, massages it with vitamin E oil  Social Support:     Lives with:  Spouse  Hand dominance:  Right  Treatments:     Treatment Comments:  Uses paraffin x1 a week currently, self massage and stretching   Activities of Daily Living:     Patient reported ADL status:  "Feels fingers get \"stuck\" and she can't extend them, describes this as clawing up, notes this happens after cleaning her floor, longer drives, in the AM. Uses compensatory options like a jar opener.   Patient Goals:     Patient goals for therapy:  Decreased pain, increased strength and increased motion      Past Medical History:   Diagnosis Date    Arthritis     osteo-hands, left ankle    High cholesterol     Pt denies     Past Surgical History:   Procedure Laterality Date    INGUINAL HERNIA REPAIR ROBOTIC XI  10/11/2023    Procedure: ROBOTIC RIGHT INGUINAL HERNIA REPAIR, POSSIBLE BILATERAL;  Surgeon: Georgie Mejía M.D.;  Location: SURGERY [facility];  Service: Gen Robotic    PB INCISE FINGER TENDON SHEATH Right 03/14/2023    Procedure: RIGHT INDEX, RING AND MIDDLE FINGER TRIGGER RELEASE;  Surgeon: Nasir Hughes M.D.;  Location: [facility];  Service: Orthopedics    BLADDER SLING FEMALE  2003    ANKLE ARTHROTOMY Left     HYSTERECTOMY LAPAROSCOPY       Social History     Tobacco Use    Smoking status: Never    Smokeless tobacco: Never   Substance Use Topics    Alcohol use: Yes     Comment: 3 per month     Family and Occupational History     Socioeconomic History    Marital status:      Spouse name: Not on file    Number of children: Not on file    Years of education: Not on file    Highest education level: Not on file   Occupational History    Not on file       Objective     Active Range of Motion     Left Wrist   Normal active range of motion    Right Wrist   Normal active range of motion    Left Thumb     Normal active range of motion    Right Thumb     Normal active range of motion    Additional Active Range of Motion Details  Slight decrease in R pip/dip flexion primarily in middle finger slightly in ring finger as well, cannot make full fist. Pinky hyperextends at mcp relative to L hand. Tendency toward thumb IP hyperextension with functional use.     Strength     Left Wrist/Hand "      (2nd hand position)     Trial 1: 46    Trial 2: 57    Right Wrist/Hand      (2nd hand position)     Trial 1: 38    Trial 2: 53    General Comments     Wrist/Hand Comments  9 hole peg:   R: :19   L: :22         Therapeutic Exercises (CPT 10454):     1. tendon glides, x15, RUE    2. grasp/release with spacers, x15, RUE    Therapeutic Treatments and Modalities:    1. Manual Therapy (CPT 41555), R hand, IASTM to R palm digits, cupping to R scar for lift    Time-based treatments/modalities:  Occupational Therapy Timed Treatment Charges  Therapeutic activity minutes (CPT 56639): 15 minutes      Assessment and Plan:   Problem list/assessment: abnormal or restricted ROM, decreased HEP knowledge, decreased strength, hypersensitivity and pain    Assessment details:  Patient presents to OT with c/o R hand pain, she has longstanding history of trigger finger with surgical release and frequent steroid injections, possible dupytrens. She does appear to have scar tissue from surgical release with tendon adhesion in middle finger, scar tissue at ring finger as well though no visible adhesion. We initiated IASTM and cupping to palm toward increased soft tissue and dermal mobility, advised to increase paraffin usage, issued initial HEP focus on tendon mobility and decreasing neural tension. Will continue to monitor and progress as tolerated.   Barriers to therapy:  None    Goals:   Short Term Goals: decrease pain, increase range of motion, increase soft tissue/skin mobility, increase strength and independent with HEP performance  Short term goal timespan:  2-4 weeks  Patient progress towards short term goals:  HEP and IASTM initiated  Long term goal timespan:  6-8 weeks  Patient progress towards Long Term goals:  Patient will be independent with HEP for long term management of R hand dysfunction  Patient will improve  strength in R hand to 50# to indicate improving hand function  Patient will report no greater than  "2/10 pain for 1 week   Patient will report decreased \"clawing\" sensation after cleaning floor     Plan:   Occupational/Hand Therapy options:  Occupational therapy treatment to continue  Planned therapy interventions:  AROM, A/AROM, PROM, passive stretch, graded resistive tasks to increase strength, pain management, patient/family/caregiver education, manual therapy (CPT 00075), home exercise training and adaptive training to increase functional performance  Planned modality interventions: paraffin treatment (CPT 10953) and moist hot pack (CPT 30363)  Prognosis: good    Frequency:  1x week  Duration in weeks:  8  Duration in visits:  8  Discussed with:  Patient  Patient/caregiver understanding of therapy treatment and goals: Good understanding of therapy treatment and goals  Plan details:  Decrease pain in R hand, target soft tissue to decrease tendon adhesion at scar site, progress AROM and strengthening as tolerated, education on joint protection, pain management, adaptive strategies as tolerated       Functional Assessment Used    UEFI 65/80    Referring provider co-signature:  I have reviewed this plan of care and my co-signature certifies the need for services.    Certification Period: 09/23/2024 to  12/22/24    Physician Signature: ________________________________ Date: ______________            "

## 2024-09-24 DIAGNOSIS — R73.03 PREDIABETES: ICD-10-CM

## 2024-09-24 DIAGNOSIS — E78.49 OTHER HYPERLIPIDEMIA: ICD-10-CM

## 2024-09-24 DIAGNOSIS — E66.09 CLASS 1 OBESITY DUE TO EXCESS CALORIES WITH SERIOUS COMORBIDITY AND BODY MASS INDEX (BMI) OF 31.0 TO 31.9 IN ADULT: ICD-10-CM

## 2024-09-24 RX ORDER — TIRZEPATIDE 5 MG/.5ML
5 INJECTION, SOLUTION SUBCUTANEOUS
Qty: 2 ML | Refills: 3 | Status: SHIPPED | OUTPATIENT
Start: 2024-09-24

## 2024-10-07 ENCOUNTER — OCCUPATIONAL THERAPY (OUTPATIENT)
Dept: OCCUPATIONAL THERAPY | Facility: MEDICAL CENTER | Age: 59
End: 2024-10-07
Attending: STUDENT IN AN ORGANIZED HEALTH CARE EDUCATION/TRAINING PROGRAM
Payer: COMMERCIAL

## 2024-10-07 DIAGNOSIS — M79.641 RIGHT HAND PAIN: ICD-10-CM

## 2024-10-07 PROCEDURE — 97140 MANUAL THERAPY 1/> REGIONS: CPT

## 2024-10-07 PROCEDURE — 97530 THERAPEUTIC ACTIVITIES: CPT

## 2024-10-07 PROCEDURE — 97110 THERAPEUTIC EXERCISES: CPT

## 2024-10-14 ENCOUNTER — APPOINTMENT (OUTPATIENT)
Dept: OCCUPATIONAL THERAPY | Facility: MEDICAL CENTER | Age: 59
End: 2024-10-14
Attending: STUDENT IN AN ORGANIZED HEALTH CARE EDUCATION/TRAINING PROGRAM
Payer: COMMERCIAL

## 2024-10-18 DIAGNOSIS — E66.811 CLASS 1 OBESITY DUE TO EXCESS CALORIES WITH SERIOUS COMORBIDITY AND BODY MASS INDEX (BMI) OF 31.0 TO 31.9 IN ADULT: ICD-10-CM

## 2024-10-18 DIAGNOSIS — E66.09 CLASS 1 OBESITY DUE TO EXCESS CALORIES WITH SERIOUS COMORBIDITY AND BODY MASS INDEX (BMI) OF 31.0 TO 31.9 IN ADULT: ICD-10-CM

## 2024-10-21 ENCOUNTER — APPOINTMENT (OUTPATIENT)
Dept: OCCUPATIONAL THERAPY | Facility: MEDICAL CENTER | Age: 59
End: 2024-10-21
Attending: STUDENT IN AN ORGANIZED HEALTH CARE EDUCATION/TRAINING PROGRAM
Payer: COMMERCIAL

## 2024-10-21 RX ORDER — TIRZEPATIDE 5 MG/.5ML
5 INJECTION, SOLUTION SUBCUTANEOUS
Qty: 2 ML | Refills: 3 | Status: SHIPPED | OUTPATIENT
Start: 2024-10-21

## 2024-10-28 ENCOUNTER — APPOINTMENT (OUTPATIENT)
Dept: OCCUPATIONAL THERAPY | Facility: MEDICAL CENTER | Age: 59
End: 2024-10-28
Attending: STUDENT IN AN ORGANIZED HEALTH CARE EDUCATION/TRAINING PROGRAM
Payer: COMMERCIAL

## 2024-11-19 ENCOUNTER — TELEPHONE (OUTPATIENT)
Dept: MEDICAL GROUP | Facility: MEDICAL CENTER | Age: 59
End: 2024-11-19
Payer: COMMERCIAL

## 2024-11-20 ENCOUNTER — APPOINTMENT (OUTPATIENT)
Dept: RADIOLOGY | Facility: MEDICAL CENTER | Age: 59
End: 2024-11-20
Attending: FAMILY MEDICINE
Payer: COMMERCIAL

## 2024-11-20 DIAGNOSIS — Z12.31 VISIT FOR SCREENING MAMMOGRAM: ICD-10-CM

## 2024-11-20 PROCEDURE — 77067 SCR MAMMO BI INCL CAD: CPT

## 2024-11-20 NOTE — TELEPHONE ENCOUNTER
Phone Number Called: [phone]    Call outcome: Spoke to patient regarding message below.    Message: Pt was contacted in regards to her vm. Pt was req a refill on zepbound to [facility]. Pt was informed her zepbound has 3 refills available.

## 2024-12-09 ENCOUNTER — APPOINTMENT (OUTPATIENT)
Dept: URBAN - METROPOLITAN AREA CLINIC 4 | Facility: CLINIC | Age: 59
Setting detail: DERMATOLOGY
End: 2024-12-09

## 2024-12-09 DIAGNOSIS — L82.0 INFLAMED SEBORRHEIC KERATOSIS: ICD-10-CM

## 2024-12-09 DIAGNOSIS — D22 MELANOCYTIC NEVI: ICD-10-CM

## 2024-12-09 DIAGNOSIS — D485 NEOPLASM OF UNCERTAIN BEHAVIOR OF SKIN: ICD-10-CM

## 2024-12-09 DIAGNOSIS — Z71.89 OTHER SPECIFIED COUNSELING: ICD-10-CM

## 2024-12-09 PROBLEM — D48.5 NEOPLASM OF UNCERTAIN BEHAVIOR OF SKIN: Status: ACTIVE | Noted: 2024-12-09

## 2024-12-09 PROBLEM — D22.5 MELANOCYTIC NEVI OF TRUNK: Status: ACTIVE | Noted: 2024-12-09

## 2024-12-09 PROCEDURE — ? LIQUID NITROGEN

## 2024-12-09 PROCEDURE — 17110 DESTRUCTION B9 LES UP TO 14: CPT

## 2024-12-09 PROCEDURE — ? COUNSELING

## 2024-12-09 PROCEDURE — ? PHOTO-DOCUMENTATION

## 2024-12-09 PROCEDURE — 99213 OFFICE O/P EST LOW 20 MIN: CPT | Mod: 25

## 2024-12-09 PROCEDURE — ? ADDITIONAL NOTES

## 2024-12-09 ASSESSMENT — LOCATION ZONE DERM
LOCATION ZONE: NECK
LOCATION ZONE: TRUNK

## 2024-12-09 ASSESSMENT — LOCATION DETAILED DESCRIPTION DERM
LOCATION DETAILED: RIGHT SUPERIOR UPPER BACK
LOCATION DETAILED: RIGHT CENTRAL LATERAL NECK
LOCATION DETAILED: LEFT BUTTOCK

## 2024-12-09 ASSESSMENT — LOCATION SIMPLE DESCRIPTION DERM
LOCATION SIMPLE: LEFT BUTTOCK
LOCATION SIMPLE: RIGHT UPPER BACK
LOCATION SIMPLE: NECK

## 2024-12-09 NOTE — PROCEDURE: ADDITIONAL NOTES
Additional Notes: Will re-check the nevus of the back at f/u. Self skin monitoring with reporting. Unchanged today.
Detail Level: Simple
Render Risk Assessment In Note?: no
Additional Notes: Patient will contact me if any change prior to next visit.

## 2024-12-09 NOTE — PROCEDURE: LIQUID NITROGEN
Spray Paint Technique: No
Medical Necessity Information: It is in your best interest to select a reason for this procedure from the list below. All of these items fulfill various CMS LCD requirements except the new and changing color options.
Medical Necessity Clause: This procedure was medically necessary because the lesions that were treated were:
Post-Care Instructions: I reviewed with the patient in detail post-care instructions. Patient is to wear sunprotection, and avoid picking at any of the treated lesions. Pt may apply Vaseline to crusted or scabbing areas.
Detail Level: Detailed
Show Spray Paint Technique Variable?: Yes
Consent: The patient's consent was obtained including but not limited to risks of crusting, scabbing, blistering, scarring, darker or lighter pigmentary change, recurrence, incomplete removal and infection.
Spray Paint Text: The liquid nitrogen was applied to the skin utilizing a spray paint frosting technique.

## 2024-12-17 DIAGNOSIS — E66.811 CLASS 1 OBESITY DUE TO EXCESS CALORIES WITH SERIOUS COMORBIDITY AND BODY MASS INDEX (BMI) OF 31.0 TO 31.9 IN ADULT: ICD-10-CM

## 2024-12-17 DIAGNOSIS — E66.09 CLASS 1 OBESITY DUE TO EXCESS CALORIES WITH SERIOUS COMORBIDITY AND BODY MASS INDEX (BMI) OF 31.0 TO 31.9 IN ADULT: ICD-10-CM

## 2024-12-18 RX ORDER — TIRZEPATIDE 5 MG/.5ML
5 INJECTION, SOLUTION SUBCUTANEOUS
Qty: 2 ML | Refills: 0 | Status: SHIPPED | OUTPATIENT
Start: 2024-12-18

## 2024-12-18 NOTE — TELEPHONE ENCOUNTER
VOICEMAIL  1. Caller Name: Jaymie                      Call Back Number: [phone]    2. Message: Rx refill    3. Patient approves office to leave a detailed voicemail/MyChart message: yes

## 2025-01-15 DIAGNOSIS — E66.09 CLASS 1 OBESITY DUE TO EXCESS CALORIES WITH SERIOUS COMORBIDITY AND BODY MASS INDEX (BMI) OF 31.0 TO 31.9 IN ADULT: ICD-10-CM

## 2025-01-15 DIAGNOSIS — E66.811 CLASS 1 OBESITY DUE TO EXCESS CALORIES WITH SERIOUS COMORBIDITY AND BODY MASS INDEX (BMI) OF 31.0 TO 31.9 IN ADULT: ICD-10-CM

## 2025-01-15 RX ORDER — TIRZEPATIDE 5 MG/.5ML
5 INJECTION, SOLUTION SUBCUTANEOUS
Qty: 2 ML | Refills: 0 | Status: SHIPPED | OUTPATIENT
Start: 2025-01-15

## 2025-01-15 NOTE — TELEPHONE ENCOUNTER
Received request via: Patient    Was the patient seen in the last year in this department? Yes    Does the patient have an active prescription (recently filled or refills available) for medication(s) requested? No    [facility]    Does the patient have long-term Plus and need 100-day supply? (This applies to ALL medications) Patient does not have SCP

## 2025-02-13 DIAGNOSIS — E66.09 CLASS 1 OBESITY DUE TO EXCESS CALORIES WITH SERIOUS COMORBIDITY AND BODY MASS INDEX (BMI) OF 31.0 TO 31.9 IN ADULT: ICD-10-CM

## 2025-02-13 DIAGNOSIS — E66.811 CLASS 1 OBESITY DUE TO EXCESS CALORIES WITH SERIOUS COMORBIDITY AND BODY MASS INDEX (BMI) OF 31.0 TO 31.9 IN ADULT: ICD-10-CM

## 2025-02-14 RX ORDER — TIRZEPATIDE 5 MG/.5ML
5 INJECTION, SOLUTION SUBCUTANEOUS
Qty: 2 ML | Refills: 0 | Status: SHIPPED | OUTPATIENT
Start: 2025-02-14 | End: 2025-02-25

## 2025-02-25 ENCOUNTER — OFFICE VISIT (OUTPATIENT)
Dept: MEDICAL GROUP | Facility: MEDICAL CENTER | Age: 60
End: 2025-02-25
Payer: COMMERCIAL

## 2025-02-25 VITALS
HEIGHT: 63 IN | OXYGEN SATURATION: 99 % | BODY MASS INDEX: 27.46 KG/M2 | SYSTOLIC BLOOD PRESSURE: 108 MMHG | TEMPERATURE: 97.1 F | HEART RATE: 78 BPM | WEIGHT: 155 LBS | DIASTOLIC BLOOD PRESSURE: 68 MMHG

## 2025-02-25 DIAGNOSIS — R73.03 PREDIABETES: ICD-10-CM

## 2025-02-25 DIAGNOSIS — E78.49 OTHER HYPERLIPIDEMIA: ICD-10-CM

## 2025-02-25 DIAGNOSIS — E66.3 OVERWEIGHT: ICD-10-CM

## 2025-02-25 PROCEDURE — 3078F DIAST BP <80 MM HG: CPT | Performed by: STUDENT IN AN ORGANIZED HEALTH CARE EDUCATION/TRAINING PROGRAM

## 2025-02-25 PROCEDURE — 3074F SYST BP LT 130 MM HG: CPT | Performed by: STUDENT IN AN ORGANIZED HEALTH CARE EDUCATION/TRAINING PROGRAM

## 2025-02-25 PROCEDURE — 99214 OFFICE O/P EST MOD 30 MIN: CPT | Performed by: STUDENT IN AN ORGANIZED HEALTH CARE EDUCATION/TRAINING PROGRAM

## 2025-02-25 ASSESSMENT — PATIENT HEALTH QUESTIONNAIRE - PHQ9: CLINICAL INTERPRETATION OF PHQ2 SCORE: 0

## 2025-02-25 NOTE — PROGRESS NOTES
"Subjective:     CC: Weight management, prediabetes    HPI:   Jaymie presents today to discuss weight management.  She has been on tirzepatide 5 mg weekly for several months.  She has lost about 25 pounds.  Her weight has now plateaued.  She is wondering if there are options for increasing the dose or possibly cheaper options.  She does have a history of prediabetes without recent labs.    ROS:  ROS    Objective:     Exam:  /68 (BP Location: Left arm, Patient Position: Sitting, BP Cuff Size: Adult)   Pulse 78   Temp 36.2 °C (97.1 °F) (Temporal)   Ht 1.6 m (5' 3\")   Wt 70.3 kg (155 lb)   SpO2 99%   BMI 27.46 kg/m²  Body mass index is 27.46 kg/m².    Physical Exam  Vitals reviewed.   Constitutional:       General: She is not in acute distress.     Appearance: She is not toxic-appearing.   HENT:      Head: Normocephalic and atraumatic.      Right Ear: External ear normal.      Left Ear: External ear normal.   Eyes:      General:         Right eye: No discharge.         Left eye: No discharge.      Extraocular Movements: Extraocular movements intact.      Conjunctiva/sclera: Conjunctivae normal.   Pulmonary:      Effort: Pulmonary effort is normal. No respiratory distress.   Skin:     General: Skin is warm and dry.   Neurological:      Mental Status: She is alert.   Psychiatric:         Mood and Affect: Mood normal.         Behavior: Behavior normal.         Thought Content: Thought content normal.         Judgment: Judgment normal.           Assessment & Plan:     59 y.o. female with the following -     1. Prediabetes  Increase tirzepatide to 7.5 mg weekly, A1c ordered, this will be sent to [facility] pharmacy if needed due to financial costs  - Tirzepatide 7.5 MG/0.5ML Solution Auto-injector; Inject 7.5 mg under the skin every 7 days.  Dispense: 2 mL; Refill: 2  - HEMOGLOBIN A1C; Future    2. Overweight  Doing well, increase tirzepatide  - Tirzepatide 7.5 MG/0.5ML Solution Auto-injector; Inject 7.5 mg " under the skin every 7 days.  Dispense: 2 mL; Refill: 2  - Lipid Profile; Future    3. Other hyperlipidemia  Recheck labs  - Lipid Profile; Future      No follow-ups on file.    Please note that this dictation was created using voice recognition software. I have made every reasonable attempt to correct obvious errors, but I expect that there are errors of grammar and possibly content that I did not discover before finalizing the note.

## 2025-02-26 ENCOUNTER — TELEPHONE (OUTPATIENT)
Dept: MEDICAL GROUP | Facility: MEDICAL CENTER | Age: 60
End: 2025-02-26
Payer: COMMERCIAL

## 2025-02-26 NOTE — TELEPHONE ENCOUNTER
Phone Number Called: [phone]    Call outcome: Spoke to patient regarding message below.    Message: Pt called and left VM stating that [facility] for zepbound. I gave her the information for [facility] and told her we will swap the rx over today.

## 2025-02-26 NOTE — TELEPHONE ENCOUNTER
Please fax over Rx that is on your desk and then call patient and let her know this has been completed.  Please also give her the phone number and address of the [facility] pharmacy    Thank You,  Dr. Campos

## 2025-02-27 ENCOUNTER — HOSPITAL ENCOUNTER (OUTPATIENT)
Facility: MEDICAL CENTER | Age: 60
End: 2025-02-27
Attending: STUDENT IN AN ORGANIZED HEALTH CARE EDUCATION/TRAINING PROGRAM
Payer: COMMERCIAL

## 2025-02-27 DIAGNOSIS — R73.03 PREDIABETES: ICD-10-CM

## 2025-02-27 DIAGNOSIS — E78.49 OTHER HYPERLIPIDEMIA: ICD-10-CM

## 2025-02-27 DIAGNOSIS — E66.3 OVERWEIGHT: ICD-10-CM

## 2025-02-27 LAB
CHOLEST SERPL-MCNC: 246 MG/DL (ref 100–199)
EST. AVERAGE GLUCOSE BLD GHB EST-MCNC: 108 MG/DL
FASTING STATUS PATIENT QL REPORTED: NORMAL
HBA1C MFR BLD: 5.4 % (ref 4–5.6)
HDLC SERPL-MCNC: 68 MG/DL
LDLC SERPL CALC-MCNC: 159 MG/DL
TRIGL SERPL-MCNC: 97 MG/DL (ref 0–149)

## 2025-02-27 PROCEDURE — 36415 COLL VENOUS BLD VENIPUNCTURE: CPT

## 2025-02-27 PROCEDURE — 83036 HEMOGLOBIN GLYCOSYLATED A1C: CPT

## 2025-02-27 PROCEDURE — 80061 LIPID PANEL: CPT

## 2025-02-28 ENCOUNTER — RESULTS FOLLOW-UP (OUTPATIENT)
Dept: MEDICAL GROUP | Facility: MEDICAL CENTER | Age: 60
End: 2025-02-28

## 2025-02-28 NOTE — RESULT ENCOUNTER NOTE
Cholesterol is elevated, about stable from where it was 8 months ago, blood sugar has come back down into the normal range    Thank You,  Dr. Campos

## 2025-04-22 DIAGNOSIS — R73.03 PREDIABETES: ICD-10-CM

## 2025-04-22 DIAGNOSIS — E66.3 OVERWEIGHT: ICD-10-CM

## 2025-04-22 NOTE — TELEPHONE ENCOUNTER
Received request via: Patient    Was the patient seen in the last year in this department? Yes    Does the patient have an active prescription (recently filled or refills available) for medication(s) requested? No    [facility]    Does the patient have MCFP Plus and need 100-day supply? (This applies to ALL medications) Patient does not have SCP

## 2025-05-23 DIAGNOSIS — R73.03 PREDIABETES: ICD-10-CM

## 2025-05-23 DIAGNOSIS — E66.3 OVERWEIGHT: ICD-10-CM

## 2025-05-23 NOTE — TELEPHONE ENCOUNTER
VOICEMAIL  1. Caller Name: ramana                      Call Back Number: [phone]    2. Message: Pt called in requesting a refill of her zepbound sent to [facility] on [address]    3. Patient approves office to leave a detailed voicemail/MyChart message: N\A    Received request via: Patient    Was the patient seen in the last year in this department? Yes    Does the patient have an active prescription (recently filled or refills available) for medication(s) requested? No    Pharmacy Name: [facility]    Does the patient have assisted Plus and need 100-day supply? (This applies to ALL medications) Patient does not have SCP

## 2025-05-23 NOTE — TELEPHONE ENCOUNTER
Please call the patient. A short supply has been provided, but they need an appointment for continued refills.

## 2025-07-09 ENCOUNTER — TELEPHONE (OUTPATIENT)
Dept: MEDICAL GROUP | Facility: MEDICAL CENTER | Age: 60
End: 2025-07-09
Payer: COMMERCIAL

## 2025-07-09 DIAGNOSIS — R73.03 PREDIABETES: ICD-10-CM

## 2025-07-09 DIAGNOSIS — E66.09 CLASS 1 OBESITY DUE TO EXCESS CALORIES WITH SERIOUS COMORBIDITY AND BODY MASS INDEX (BMI) OF 31.0 TO 31.9 IN ADULT: Primary | ICD-10-CM

## 2025-07-09 DIAGNOSIS — E66.811 CLASS 1 OBESITY DUE TO EXCESS CALORIES WITH SERIOUS COMORBIDITY AND BODY MASS INDEX (BMI) OF 31.0 TO 31.9 IN ADULT: Primary | ICD-10-CM

## 2025-07-09 DIAGNOSIS — E78.49 OTHER HYPERLIPIDEMIA: ICD-10-CM

## 2025-07-09 NOTE — TELEPHONE ENCOUNTER
VOICEMAIL  1. Caller Name: Jaymie Haywood                        Call Back Number: [phone]     2. Message: She used the Zepbound that you proscribed her. She was looking into it and her medical dose not cover the Zepbound but they do cover Mounjaro. She was wondering if it will mess her up if she switched because it would be a lot cheaper for her.

## 2025-07-10 NOTE — TELEPHONE ENCOUNTER
Please let patient know that I have sent in the Mounjaro.  Mounjaro is not approved for weight loss by the FDA, only Zepbound is so a lot of the time even if insurances will cover Mounjaro it will not for weight loss.  We will see how this goes for her    Thank You,  Dr. Campos

## 2025-07-18 DIAGNOSIS — E66.811 CLASS 1 OBESITY DUE TO EXCESS CALORIES WITH SERIOUS COMORBIDITY AND BODY MASS INDEX (BMI) OF 31.0 TO 31.9 IN ADULT: ICD-10-CM

## 2025-07-18 DIAGNOSIS — E78.49 OTHER HYPERLIPIDEMIA: ICD-10-CM

## 2025-07-18 DIAGNOSIS — E66.09 CLASS 1 OBESITY DUE TO EXCESS CALORIES WITH SERIOUS COMORBIDITY AND BODY MASS INDEX (BMI) OF 31.0 TO 31.9 IN ADULT: ICD-10-CM

## 2025-07-18 DIAGNOSIS — R73.03 PREDIABETES: ICD-10-CM

## 2025-07-18 NOTE — TELEPHONE ENCOUNTER
Fax Sent to Plan  Your prior authorization has been faxed to the plan as a paper copy. You will be notified of the determination via fax.

## 2025-07-29 RX ORDER — TIRZEPATIDE 7.5 MG/.5ML
7.5 INJECTION, SOLUTION SUBCUTANEOUS
Qty: 2 ML | Refills: 2 | Status: SHIPPED
Start: 2025-07-29

## (undated) DEVICE — NEEDLE DRIVER MEGA SUTURECUT DA VINCI 15X'S REUSABLE

## (undated) DEVICE — NEEDLE INSFL 120MM 14GA VRRS - (20/BX)

## (undated) DEVICE — TUBE CONNECTING SUCTION - CLEAR PLASTIC STERILE 72 IN (50EA/CA)

## (undated) DEVICE — SEAL 5MM-8MM UNIVERSAL  BOX OF 10

## (undated) DEVICE — DRAPE ARM  BOX OF 20

## (undated) DEVICE — BIPOLAR FORCE DA VINCI 12X'S REISABLE

## (undated) DEVICE — IRRIGATOR SUCTION ENDOWRIST DISPOSABLE OD8 MM (6EA/BX)

## (undated) DEVICE — WATER IRRIGATION STERILE 1000ML (12EA/CA)

## (undated) DEVICE — SEAL CANNULA STAPLER 12 MM (10EA/BX)

## (undated) DEVICE — BAG RETRIEVAL 5MM (10EA/BX)

## (undated) DEVICE — SUTURE2-0 20CM STRATAFIX SPIRAL PDS CT-1 (12EA/BX)

## (undated) DEVICE — REDUCER XI STAPLER 12MM TO 8MM (6EA/BX)

## (undated) DEVICE — COVER TIP ENDOWRIST HOT SHEAR - (10EA/BX) DA VINCI

## (undated) DEVICE — TOWEL STOP TIMEOUT SAFETY FLAG (40EA/CA)

## (undated) DEVICE — GLOVE BIOGEL PI INDICATOR SZ 7.0 SURGICAL PF LF - (50/BX 4BX/CA)

## (undated) DEVICE — GLOVE SZ 6.5 BIOGEL PI MICRO - PF LF (50PR/BX)

## (undated) DEVICE — GOWN WARMING STANDARD FLEX - (30/CA)

## (undated) DEVICE — SHEARS MONOPOLAR CURVED  DA VINCI 10X'S REUSABLE

## (undated) DEVICE — ROBOTIC SURGERY SERVICES

## (undated) DEVICE — SYSTEM CLEARIFY VISUALIZATION (10EA/PK)

## (undated) DEVICE — CONTAINER SPECIMEN BAG OR - STERILE 4 OZ W/LID (100EA/CA)

## (undated) DEVICE — Device

## (undated) DEVICE — SUTURE GENERAL

## (undated) DEVICE — DRAPE COLUMN  BOX OF 20

## (undated) DEVICE — SUTURE 4-0 MONOCRYL PLUS PS-2 - 27 INCH (36/BX)

## (undated) DEVICE — OBTURATOR BLADELESS STANDARD 8MM (6EA/BX)

## (undated) DEVICE — SUTURE 3-0 VICRYL PLUS SH - 27 INCH (36/BX)

## (undated) DEVICE — ELECTRODE DUAL RETURN W/ CORD - (50/PK)

## (undated) DEVICE — DERMABOND ADVANCED - (12EA/BX)

## (undated) DEVICE — SODIUM CHL IRRIGATION 0.9% 1000ML (12EA/CA)

## (undated) DEVICE — DRAPE STRLE REG TOWEL 18X24 - (10/BX 4BX/CA)"